# Patient Record
Sex: FEMALE | Race: WHITE | Employment: UNEMPLOYED | URBAN - METROPOLITAN AREA
[De-identification: names, ages, dates, MRNs, and addresses within clinical notes are randomized per-mention and may not be internally consistent; named-entity substitution may affect disease eponyms.]

---

## 2019-07-16 ENCOUNTER — OFFICE VISIT (OUTPATIENT)
Dept: ORTHOPEDIC SURGERY | Age: 84
End: 2019-07-16
Payer: MEDICARE

## 2019-07-16 VITALS
HEART RATE: 74 BPM | BODY MASS INDEX: 29.81 KG/M2 | DIASTOLIC BLOOD PRESSURE: 65 MMHG | TEMPERATURE: 97.7 F | SYSTOLIC BLOOD PRESSURE: 138 MMHG | WEIGHT: 162 LBS | HEIGHT: 62 IN

## 2019-07-16 DIAGNOSIS — M25.561 RIGHT KNEE PAIN, UNSPECIFIED CHRONICITY: ICD-10-CM

## 2019-07-16 DIAGNOSIS — M17.0 PRIMARY OSTEOARTHRITIS OF BOTH KNEES: Primary | ICD-10-CM

## 2019-07-16 PROCEDURE — G8419 CALC BMI OUT NRM PARAM NOF/U: HCPCS | Performed by: ORTHOPAEDIC SURGERY

## 2019-07-16 PROCEDURE — 1036F TOBACCO NON-USER: CPT | Performed by: ORTHOPAEDIC SURGERY

## 2019-07-16 PROCEDURE — 1090F PRES/ABSN URINE INCON ASSESS: CPT | Performed by: ORTHOPAEDIC SURGERY

## 2019-07-16 PROCEDURE — 99203 OFFICE O/P NEW LOW 30 MIN: CPT | Performed by: ORTHOPAEDIC SURGERY

## 2019-07-16 PROCEDURE — 4040F PNEUMOC VAC/ADMIN/RCVD: CPT | Performed by: ORTHOPAEDIC SURGERY

## 2019-07-16 PROCEDURE — 1123F ACP DISCUSS/DSCN MKR DOCD: CPT | Performed by: ORTHOPAEDIC SURGERY

## 2019-07-16 PROCEDURE — G8427 DOCREV CUR MEDS BY ELIG CLIN: HCPCS | Performed by: ORTHOPAEDIC SURGERY

## 2019-07-16 RX ORDER — TRIAMTERENE AND HYDROCHLOROTHIAZIDE 75; 50 MG/1; MG/1
0.5 TABLET ORAL DAILY
Refills: 3 | COMMUNITY
Start: 2019-04-22

## 2019-07-16 SDOH — HEALTH STABILITY: MENTAL HEALTH: HOW OFTEN DO YOU HAVE A DRINK CONTAINING ALCOHOL?: NEVER

## 2019-08-27 ENCOUNTER — OFFICE VISIT (OUTPATIENT)
Dept: ORTHOPEDIC SURGERY | Age: 84
End: 2019-08-27
Payer: MEDICARE

## 2019-08-27 VITALS
HEIGHT: 62 IN | SYSTOLIC BLOOD PRESSURE: 140 MMHG | TEMPERATURE: 97.3 F | HEART RATE: 75 BPM | BODY MASS INDEX: 29.81 KG/M2 | WEIGHT: 162 LBS | DIASTOLIC BLOOD PRESSURE: 73 MMHG

## 2019-08-27 DIAGNOSIS — M17.0 PRIMARY OSTEOARTHRITIS OF BOTH KNEES: Primary | ICD-10-CM

## 2019-08-27 PROCEDURE — G8419 CALC BMI OUT NRM PARAM NOF/U: HCPCS | Performed by: ORTHOPAEDIC SURGERY

## 2019-08-27 PROCEDURE — 1123F ACP DISCUSS/DSCN MKR DOCD: CPT | Performed by: ORTHOPAEDIC SURGERY

## 2019-08-27 PROCEDURE — 4040F PNEUMOC VAC/ADMIN/RCVD: CPT | Performed by: ORTHOPAEDIC SURGERY

## 2019-08-27 PROCEDURE — G8427 DOCREV CUR MEDS BY ELIG CLIN: HCPCS | Performed by: ORTHOPAEDIC SURGERY

## 2019-08-27 PROCEDURE — 1036F TOBACCO NON-USER: CPT | Performed by: ORTHOPAEDIC SURGERY

## 2019-08-27 PROCEDURE — 99213 OFFICE O/P EST LOW 20 MIN: CPT | Performed by: ORTHOPAEDIC SURGERY

## 2019-08-27 PROCEDURE — 1090F PRES/ABSN URINE INCON ASSESS: CPT | Performed by: ORTHOPAEDIC SURGERY

## 2019-08-27 NOTE — PROGRESS NOTES
answered follow-up as needed. Return if symptoms worsen or fail to improve. Current Outpatient Medications   Medication Sig Dispense Refill    triamterene-hydrochlorothiazide (MAXZIDE) 75-50 MG per tablet Take 0.5 tablets by mouth daily   3     No current facility-administered medications for this visit. There is no problem list on file for this patient. Past Medical History:   Diagnosis Date    Hypertension        Past Surgical History:   Procedure Laterality Date    CATARACT REMOVAL WITH IMPLANT Bilateral        No Known Allergies    Social History     Socioeconomic History    Marital status:      Spouse name: None    Number of children: None    Years of education: None    Highest education level: None   Occupational History    None   Social Needs    Financial resource strain: None    Food insecurity:     Worry: None     Inability: None    Transportation needs:     Medical: None     Non-medical: None   Tobacco Use    Smoking status: Never Smoker    Smokeless tobacco: Never Used   Substance and Sexual Activity    Alcohol use: Yes     Alcohol/week: 1.0 standard drinks     Types: 1 Glasses of wine per week     Frequency: Never     Comment: Daily    Drug use: Never    Sexual activity: None   Lifestyle    Physical activity:     Days per week: None     Minutes per session: None    Stress: None   Relationships    Social connections:     Talks on phone: None     Gets together: None     Attends Faith service: None     Active member of club or organization: None     Attends meetings of clubs or organizations: None     Relationship status: None    Intimate partner violence:     Fear of current or ex partner: None     Emotionally abused: None     Physically abused: None     Forced sexual activity: None   Other Topics Concern    None   Social History Narrative    None       History reviewed. No pertinent family history.       Review of Systems  As follows except as previously

## 2021-03-09 ENCOUNTER — OFFICE VISIT (OUTPATIENT)
Dept: ORTHOPEDIC SURGERY | Age: 86
End: 2021-03-09
Payer: MEDICARE

## 2021-03-09 VITALS — BODY MASS INDEX: 29.81 KG/M2 | WEIGHT: 162 LBS | HEIGHT: 62 IN

## 2021-03-09 DIAGNOSIS — M75.41 IMPINGEMENT SYNDROME OF SHOULDER REGION, RIGHT: Primary | ICD-10-CM

## 2021-03-09 DIAGNOSIS — M79.601 PAIN OF RIGHT UPPER EXTREMITY: ICD-10-CM

## 2021-03-09 PROCEDURE — G8417 CALC BMI ABV UP PARAM F/U: HCPCS | Performed by: ORTHOPAEDIC SURGERY

## 2021-03-09 PROCEDURE — G8484 FLU IMMUNIZE NO ADMIN: HCPCS | Performed by: ORTHOPAEDIC SURGERY

## 2021-03-09 PROCEDURE — G8427 DOCREV CUR MEDS BY ELIG CLIN: HCPCS | Performed by: ORTHOPAEDIC SURGERY

## 2021-03-09 PROCEDURE — 4040F PNEUMOC VAC/ADMIN/RCVD: CPT | Performed by: ORTHOPAEDIC SURGERY

## 2021-03-09 PROCEDURE — 1036F TOBACCO NON-USER: CPT | Performed by: ORTHOPAEDIC SURGERY

## 2021-03-09 PROCEDURE — 1123F ACP DISCUSS/DSCN MKR DOCD: CPT | Performed by: ORTHOPAEDIC SURGERY

## 2021-03-09 PROCEDURE — 99213 OFFICE O/P EST LOW 20 MIN: CPT | Performed by: ORTHOPAEDIC SURGERY

## 2021-03-09 PROCEDURE — 1090F PRES/ABSN URINE INCON ASSESS: CPT | Performed by: ORTHOPAEDIC SURGERY

## 2021-03-09 NOTE — PROGRESS NOTES
Chief Complaint:   Chief Complaint   Patient presents with    Arm Pain     Right upper arm pain since January. No X-rays. States 1st COVID injection caused pain to be worse. Pain when she lifts arm or pushes off when getting out of chair. Dawit Barr presents with an approximate 2-month history of diffuse subdeltoid pain at and below the right shoulder, no history of trauma, onset may have been after doing some overuse lifting and carrying objects, pain is persistent not interfering with sleep however, limiting her daily activities involving reaching forward or overhead. Denies numbness tingling in the hand. Denies previous problems with the shoulder no other joint complaints. Allergies; medications; past medical, surgical, family, and social history; and problem list have been reviewed today and updated as indicated in this encounter seen below. Exam: Healthy-appearing elderly female, left upper extremity normal, right shoulder shows no effusion or erythema, subacromial tenderness noted, passive range of motion normal but with grossly positive impingement signs with pain in forward or in abduction, negative drop test but weak with resisted abduction and flexion, no crepitus with rotation. No objective motor or sensory deficit in the hand. Radiographs: Deferred today benign clinical exam.    Mary Ogden was seen today for arm pain. Diagnoses and all orders for this visit:    Impingement syndrome of shoulder region, right  -     Ambulatory referral to Physical Therapy    Pain of right upper extremity       Diagnosis and treatment alternatives were reviewed with the patient, I suspect she likely has degenerative cuff pathology with significant impingement symptoms. She does not wish to pursue injections at this time, instead she was referred for physical therapy, advised to take over-the-counter's and low doses as needed if they are helpful.   If symptoms are persistent she will return in 1 month repeat exam to include x-rays and possible injection right shoulder. Return in about 1 month (around 4/9/2021). Current Outpatient Medications   Medication Sig Dispense Refill    triamterene-hydrochlorothiazide (MAXZIDE) 75-50 MG per tablet Take 0.5 tablets by mouth daily   3     No current facility-administered medications for this visit. There is no problem list on file for this patient. Past Medical History:   Diagnosis Date    Hypertension        Past Surgical History:   Procedure Laterality Date    BLEPHAROPLASTY Bilateral 2011    CATARACT REMOVAL WITH IMPLANT Bilateral        No Known Allergies    Social History     Socioeconomic History    Marital status:      Spouse name: None    Number of children: None    Years of education: None    Highest education level: None   Occupational History    None   Social Needs    Financial resource strain: None    Food insecurity     Worry: None     Inability: None    Transportation needs     Medical: None     Non-medical: None   Tobacco Use    Smoking status: Never Smoker    Smokeless tobacco: Never Used   Substance and Sexual Activity    Alcohol use: Yes     Alcohol/week: 1.0 standard drinks     Types: 1 Glasses of wine per week     Frequency: Never     Comment: Daily    Drug use: Never    Sexual activity: None   Lifestyle    Physical activity     Days per week: None     Minutes per session: None    Stress: None   Relationships    Social connections     Talks on phone: None     Gets together: None     Attends Anabaptist service: None     Active member of club or organization: None     Attends meetings of clubs or organizations: None     Relationship status: None    Intimate partner violence     Fear of current or ex partner: None     Emotionally abused: None     Physically abused: None     Forced sexual activity: None   Other Topics Concern    None   Social History Narrative    None       History reviewed.  No pertinent family history. Review of Systems  As follows except as previously noted in HPI:  Constitutional: Negative for chills, diaphoresis, fatigue, fever and unexpected weight change. Respiratory: Negative for cough, shortness of breath and wheezing. Cardiovascular: Negative for chest pain and palpitations. Neurological: Negative for dizziness, syncope, cephalgia. GI / : negative  Musculoskeletal: see HPI       Objective:   Physical Exam   Constitutional: Oriented to person, place, and time. and appears well-developed and well-nourished. :   Head: Normocephalic and atraumatic. Eyes: EOM are normal.   Neck: Neck supple. Cardiovascular: Normal rate and regular rhythm. Pulmonary/Chest: Effort normal. No stridor. No respiratory distress, no wheezes. Abdominal:  No abnormal distension. Neurological: Alert and oriented to person, place, and time. Skin: Skin is warm and dry. Psychiatric: Normal mood and affect.  Behavior is normal. Thought content normal.    3/9/2021  3:07 PM

## 2021-03-11 ENCOUNTER — EVALUATION (OUTPATIENT)
Dept: PHYSICAL THERAPY | Age: 86
End: 2021-03-11
Payer: MEDICARE

## 2021-03-11 ENCOUNTER — TELEPHONE (OUTPATIENT)
Dept: ORTHOPEDIC SURGERY | Age: 86
End: 2021-03-11

## 2021-03-11 DIAGNOSIS — M75.41 IMPINGEMENT SYNDROME OF RIGHT SHOULDER: ICD-10-CM

## 2021-03-11 DIAGNOSIS — M75.81 TENDINITIS OF RIGHT ROTATOR CUFF: Primary | ICD-10-CM

## 2021-03-11 PROCEDURE — 97110 THERAPEUTIC EXERCISES: CPT | Performed by: PHYSICAL THERAPIST

## 2021-03-11 PROCEDURE — 97140 MANUAL THERAPY 1/> REGIONS: CPT | Performed by: PHYSICAL THERAPIST

## 2021-03-11 PROCEDURE — 97161 PT EVAL LOW COMPLEX 20 MIN: CPT | Performed by: PHYSICAL THERAPIST

## 2021-03-11 PROCEDURE — 97112 NEUROMUSCULAR REEDUCATION: CPT | Performed by: PHYSICAL THERAPIST

## 2021-03-11 NOTE — TELEPHONE ENCOUNTER
3/11/2021 11:27 am Patient phoned office / message left on voice mail: Saw doctor on Tuesday for a bad arm, ever since Tuesday I cannot lift my arm. I have pain in my elbow to my shoulder. I do not know if it was because they were working on it. I cannot even lift a cup of coffee. After review of office notes in epic from 3/09/2021, it was noted that PT was ordered. Patient has an appointment to start PT on March 23 rd. Checked w/ the  in PT who reports: Have a cancellation for this afternoon at 2:15 pm.     3/11/2021 11:56 am Follow up phone call / spoke w/ and informed patient: Appointment available in PT this afternoon. Patient reports: Daughter is due home at 12:30 pm, will see if she can drive patient to PT appointment. Patient does not want to drive d/t arm hurts too bad.  Informed patient Dr. Gustavo Kumar is currently out of the office until next Tuesday / Will send message to the doctor

## 2021-03-11 NOTE — PROGRESS NOTES
[address] [facility] [facility] [address] [facility]   Phone: [phone]   Fax: [phone]           Date:  3/11/2021   Patient: Shara Fernandez  : 1932  MRN: [id number]  Referring Provider: Yulisa Fritz MD  [address] [place] [address]     Medical Diagnosis: Right shoulder pain. Right rotator cuff tendonitis. Right sh impingement         SUBJECTIVE:     Onset date:     Onset[de-identified] Sudden onset    Mechanism of Injury / History: The pt reports that she began experiencing sudden onset of pain & weakness at right sh with movement during the mth of  without any known mechanism of injury. Patient is right handed. Previous PT: none    Medical Management for Current Problem: none    Chief complaint: pain, decreased motion, inability / limited ability to use arm, limited ability to complete ADLs and IADLs, limited ability to lift/carry/handle material and limited ability to complete home/outdoor chores/tasks    Behavior: condition is getting worse    Pain: right sh pain  Current: 5/10     Best: 3/10     Worst:9/10    Aggravated by: reaching overhead, reaching out, reaching behind back, lifting/carrying/material handling    Relieved by: rest    Symptom Type/Quality: sharp, shooting, piercing  Location[de-identified] gross & diffuse right sh        Imaging results: No results found. Past Medical History:  Past Medical History:   Diagnosis Date    Hypertension      Past Surgical History:   Procedure Laterality Date    BLEPHAROPLASTY Bilateral     CATARACT REMOVAL WITH IMPLANT Bilateral        Medications:   Current Outpatient Medications   Medication Sig Dispense Refill    triamterene-hydrochlorothiazide (MAXZIDE) 75-50 MG per tablet Take 0.5 tablets by mouth daily   3     No current facility-administered medications for this visit. Occupation: retired.      Exercise regimen: none    Hobbies: gambling     Patient Goals: pain relief, return to prior activity, full use of arm, return to hobbies patient. If you have questions or comments, please contact me at numbers listed above. Electronically signed by: Obi Castellanos, PT PT , DPT [id number]    Medicare Patients Only     Please sign Physician's Certification and return to: [address]  [facility]  Dept: [phone]  Dept Fax: [phone] Certification / Comments     Frequency/Duration 2 days per week for 6 weeks. Certification period from 3/11/2021  to 4/30/2021. I have reviewed the Plan of Care established for skilled therapy services and certify that the services are required and that they will be provided while the patient is under my care.     Physician's Comments/Revisions:               Physician's Printed Name:                                           [de-identified] Signature:                                                               Date:

## 2021-03-11 NOTE — PROGRESS NOTES
[address] [facility]   Phone: [phone]   Fax: [phone]      Physical Therapy Daily Treatment Note    Date: 3/11/2021  Patient Name: Melodie Champion  : 1932   MRN: [id number]  DOInjury:   DOSx: NA   Referring Provider: Werner Maldonado MD  [address] [place] [address]     Medical Diagnosis: right sh pain. Right rotator cuff tendonitis. Right shoulder impingement        Outcome Measure: QuickDASH 32.5% Disability    S: The pt reports worsened right sh pain & inability to move it since physician's appointment on 3/8/2021  O: Pt given written HEP. Please refer to eval 3/11/2021  Time 2199-8124     Visit  Repeat outcome measure at mid point and end. Pain 8/10     Strength 2-/5     Palpation Supraspinatus & teres minor     ROM abd 30 flex 0     Modalities            Manual      [place]  AP, distraction,ER,IR,quadrant flex/abd/ER Right sh MT   Reza AP with arom abd Right sh MT   Stretch      Table slides flex/abd/sweeps 3x 10  TE   Wall Flexion towel 1x 5  TE   Wall ER stretch      Towel IR stretch      IR reaching behind back      Exercise      Shrugs AROM      Pendulum Ex      UBE      Pulleys - flex      Pulleys-IR      Supine wand chest press      Supine wand flex/abd/chest press/ER 3x 10 Right sh TE   Supine wand ER/IR      Supine flexion      S-lying ABD      S-lying ER      Standing wand flex      Standing flexion      Standing ABD      Kinesio Taping Mechanical correction Right sh NR   ROWS: H Functional activities  To aid in ROM and strength needed for reaching , lifting ,pushing and pulling at home/work    ROWS: M  \"    ROWS: L  \"    ER  \"    IR  \"                A:  Tolerated well. Above added to written HEP.   P: Continue with rehab plan  Elsa Gallo, PT DPT, PT [id number]    Treatment Charges: Mins Units   Initial Evaluation 20 1   Re-Evaluation     Ther Exercise         TE 15 1   Manual Therapy     MT 15 1   Ther Activities        TA     Gait Training          GT     Neuro Re-education NR 15 1   Modalities     Non-Billable Service Time     Other     Total Time/Units 65 4

## 2021-03-12 NOTE — TELEPHONE ENCOUNTER
3/12/2021 11:05 am Follow up phone call / spoke w/ patient who reports: Worked w/ therapy yesterday. Therapy helped a lot! Arm is feeling better. Patient informed of [facility]'s response: OK to take Tylenol 1000 mg three times a day not to exceed 3000 mg / 24 hrs. Instructed patient to write instructions down.  Patient complied and read instructions back per request.

## 2021-03-24 ENCOUNTER — EVALUATION (OUTPATIENT)
Dept: PHYSICAL THERAPY | Age: 86
End: 2021-03-24

## 2021-03-24 ENCOUNTER — TREATMENT (OUTPATIENT)
Dept: PHYSICAL THERAPY | Age: 86
End: 2021-03-24
Payer: MEDICARE

## 2021-03-24 DIAGNOSIS — M75.41 IMPINGEMENT SYNDROME OF RIGHT SHOULDER: ICD-10-CM

## 2021-03-24 DIAGNOSIS — M75.81 TENDINITIS OF RIGHT ROTATOR CUFF: Primary | ICD-10-CM

## 2021-03-24 PROCEDURE — 97110 THERAPEUTIC EXERCISES: CPT

## 2021-03-24 NOTE — PROGRESS NOTES
BEN , [facility], PTA [id number]    Treatment Charges: Mins Units   Initial Evaluation     Re-Evaluation     Ther Exercise         TE 45 3   Manual Therapy     MT     Ther Activities        TA     Gait Training          GT     Neuro Re-education NR     Modalities     Non-Billable Service Time     Other     Total Time/Units 45 3

## 2021-03-30 ENCOUNTER — TREATMENT (OUTPATIENT)
Dept: PHYSICAL THERAPY | Age: 86
End: 2021-03-30
Payer: MEDICARE

## 2021-03-30 DIAGNOSIS — M75.81 TENDINITIS OF RIGHT ROTATOR CUFF: Primary | ICD-10-CM

## 2021-03-30 PROCEDURE — 97110 THERAPEUTIC EXERCISES: CPT

## 2021-03-30 NOTE — PROGRESS NOTES
[address] [facility]   Phone: [phone]   Fax: [phone]      Physical Therapy Daily Treatment Note    Date: 3/30/2021  Patient Name: Marilin Thacker  : 1932   MRN: [id number]  DOInjury:   DOSx: NA   Referring Provider: Leoncio Lemus MD  [address] [place] [address]     Medical Diagnosis: right sh pain. Right rotator cuff tendonitis. Right shoulder impingement        Outcome Measure: QuickDASH 32.5% Disability    S:  Patient reports no pain at rest in R shoulder prior to session this afternoon. Continues to report increased pain when lifting coffee cup. O:   Time 8920-684     Visit 3/12 Repeat outcome measure at mid point and end. Pain See above     Strength 2-/5     Palpation Supraspinatus & teres minor     ROM abd 30 flex 0     Modalities            Manual      [place]  Right sh MT   [place] Right sh MT   Stretch      Table slides flex/abd/sweeps  TE   Wall Flexion/scaption towel  TE   Wall ER stretch      Towel IR stretch      IR reaching behind back      Exercise      Shrugs AROM      Pendulum Ex crosses/circles    Scap retractions   TE   UBE      Pulleys - flex, scap 4 min  TE   Pulleys-IR      Supine wand chest press      Supine wand flex/abd/chest press/ER 2 x 10 ea  TE   Supine wand ER/IR 2 x 10     Supine flexion      S-lying ABD      S-lying ER X 10 AAROM    Standing wand flex      Standing flexion      Standing ABD      Kinesio Taping  Right sh NR   ROWS: H Functional activities  To aid in ROM and strength needed for reaching , lifting ,pushing and pulling at home/work    ROWS: M 2 x 10 OTB    ROWS: L  \"    ER - OTB    IR 2 x 10 OTB                A:  Tolerated well. Anterior capsule stretch added to HEP. Attempted ER iso, however pt unable to follow instruction to perform correctly, therefore discontinued.      P: Continue with rehab plan    Javon Rene, RONDA [id number]    Treatment Charges: Mins Units   Initial Evaluation Re-Evaluation     Ther Exercise         TE 40 3   Manual Therapy     MT     Ther Activities        TA     Gait Training          GT     Neuro Re-education NR     Modalities     Non-Billable Service Time     Other     Total Time/Units 40 3

## 2021-04-01 ENCOUNTER — TREATMENT (OUTPATIENT)
Dept: PHYSICAL THERAPY | Age: 86
End: 2021-04-01
Payer: MEDICARE

## 2021-04-01 DIAGNOSIS — M75.81 TENDINITIS OF RIGHT ROTATOR CUFF: Primary | ICD-10-CM

## 2021-04-01 PROCEDURE — 97110 THERAPEUTIC EXERCISES: CPT

## 2021-04-01 NOTE — PROGRESS NOTES
[address] [facility]   Phone: [phone]   Fax: [phone]      Physical Therapy Daily Treatment Note    Date: 2021  Patient Name: Siobhan Schmidt  : 1932   MRN: [id number]  DOInjury:   DOSx: NA   Referring Provider: Brandi Alcazar MD  [address] [place] [address]     Medical Diagnosis: right sh pain. Right rotator cuff tendonitis. Right shoulder impingement        Outcome Measure: QuickDASH 32.5% Disability    S:  Patient reports no pain at rest in R shoulder prior to session this afternoon. Continues to report increased pain when eating or bringing coffee to her mouth. O:   Time 3805-2211     Visit  Repeat outcome measure at mid point and end. Pain See above     Strength 2-/5     Palpation Supraspinatus & teres minor     ROM abd 30 flex 0     Modalities            Manual      [place]  Right sh MT   [place] Right sh MT   Stretch      Table slides flex/ 3 x10 ea R UE   TE   Table ER stretch 10 x 10s hold    Wall Flexion/scaption towel  TE   Wall ER stretch      Towel IR stretch      IR reaching behind back      Exercise      Shrugs AROM      Pendulum Ex crosses/circles    Scap retractions   TE   UBE 5 min. Pulleys - flex, scap 4 min  TE   Pulleys-IR      Supine wand chest press      Supine wand flex/abd/chest press/ER, horizontal abd 2 x 10 ea  TE   Supine  ER/IR 2 x 10 AAROM added to HEP    Supine flexion      S-lying ABD      S-lying ER X 10 AAROM    Standing wand flex      Standing flexion      Standing ABD      Kinesio Taping  Right sh NR   ROWS: H Functional activities  To aid in ROM and strength needed for reaching , lifting ,pushing and pulling at home/work    ROWS: M OTB    ROWS: L \"    ER OTB    IR OTB                A:  Tolerated well. ER supine therex added to HEP. No c/o increased pain following treatment.      P: Continue with rehab plan    Georgette Law, PTA [id number]    Treatment Charges: Mins Units   Initial Evaluation Re-Evaluation     Ther Exercise         TE 43 3   Manual Therapy     MT     Ther Activities        TA     Gait Training          GT     Neuro Re-education NR     Modalities     Non-Billable Service Time     Other     Total Time/Units 43 3

## 2021-04-05 ENCOUNTER — TREATMENT (OUTPATIENT)
Dept: PHYSICAL THERAPY | Age: 86
End: 2021-04-05
Payer: MEDICARE

## 2021-04-05 DIAGNOSIS — M75.81 TENDINITIS OF RIGHT ROTATOR CUFF: Primary | ICD-10-CM

## 2021-04-05 DIAGNOSIS — M75.41 IMPINGEMENT SYNDROME OF RIGHT SHOULDER: ICD-10-CM

## 2021-04-05 PROCEDURE — 97110 THERAPEUTIC EXERCISES: CPT

## 2021-04-05 NOTE — PROGRESS NOTES
[address] [facility]   Phone: [phone]   Fax: [phone]      Physical Therapy Daily Treatment Note    Date: 2021  Patient Name: Karena Witt  : 1932   MRN: [id number]  DOInjury:   DOSx: NA   Referring Provider: Queen Dacia MD  [address] [place] [address]     Medical Diagnosis: RIGHT sh pain. Right rotator cuff tendonitis. Right shoulder impingement      Outcome Measure: QuickDASH 32.5% Disability    S:  Patient reports no pain at rest in R shoulder prior to session this morning. She states she continues to have problem with using R arm for such tasks as bringing coffee cup up to drink. O:   Time [id number]     Visit  Repeat outcome measure at mid point and end. Pain 0-5/10      Strength 2-/5     Palpation Supraspinatus & teres minor     ROM abd 30 flex 0     Modalities            Manual      [place]  Right sh MT   [place] Right sh MT   Stretch      Table slides flex/sweeps 3 x10 ea R UE   TE   Table ER stretch 10 x10s hold    Wall Flexion/ 2 x10 BLat pole c towel TE   Wall ER stretch      Towel IR stretch      IR reaching behind back      Exercise      Shrugs AROM      Pendulum Ex crosses/circles    Scap retractions   TE   UBE 5 min     Pulleys - flex, scap 4 min  TE   Pulleys-IR      Supine wand chest press      Supine wand flex/abd/chest press, horizontal abd 2 x10 ea  TE   Supine  ER/IR 2 x10 AAROM added to HEP    Supine flexion      S-lying ABD      S-lying ER 2 x10 AAROM    Standing wand flex      Standing flexion      Standing ABD      Kinesio Taping  Right sh NR   ROWS: H Functional activities  To aid in ROM and strength needed for reaching , lifting ,pushing and pulling at home/work    ROWS: M 3 x 10 OTB    ROWS: L      ER      IR                    A:  Patient performs all exercises well with good effort and pacing. She reports no pain following session.       P: Continue with rehab plan    Pascual Scott, [facility]

## 2021-04-07 ENCOUNTER — TREATMENT (OUTPATIENT)
Dept: PHYSICAL THERAPY | Age: 86
End: 2021-04-07
Payer: MEDICARE

## 2021-04-07 DIAGNOSIS — M75.81 TENDINITIS OF RIGHT ROTATOR CUFF: Primary | ICD-10-CM

## 2021-04-07 DIAGNOSIS — M75.41 IMPINGEMENT SYNDROME OF RIGHT SHOULDER: ICD-10-CM

## 2021-04-07 PROCEDURE — 97110 THERAPEUTIC EXERCISES: CPT | Performed by: PHYSICAL THERAPIST

## 2021-04-07 NOTE — PROGRESS NOTES
[address] [facility]   Phone: [phone]   Fax: [phone]      Physical Therapy Daily Treatment Note    Date: 2021  Patient Name: Saad Tate  : 1932   MRN: [id number]  DOInjury:   DOSx: NA   Referring Provider: Vaughn Smyth MD  [address] [place] [address]     Medical Diagnosis: RIGHT sh pain. Right rotator cuff tendonitis. Right shoulder impingement      Outcome Measure: QuickDASH 32.5% Disability    S:  Patient reports that she continues to have biggest limitation c ER behind head and drinking coffee    O:   Time 930-1015     Visit  Repeat outcome measure at mid point and end. Pain 0-5/10      Strength 2-/5     Palpation Supraspinatus & teres minor     ROM abd 30 flex 0     Modalities            Manual      [place]  AP, distraction,ER,IR,quadrant flex/abd/ERRight sh MT    Right sh MT   Stretch      Table slides flex/sweeps 3 x10 ea R UE   TE   Table ER stretch 10 x10s hold    Wall Flexion/caption towe 2 x10 BLat pole c towel TE   Ball rollout x20 5s holds  TE   [place] ER stretch      Towel IR stretch      IR reaching behind back      Exercise      Shrugs AROM      Pendulum Ex crosses/circles    Scap retractions   TE   UBE 6 min     Pulleys - flex, abd x20 5- 10s holds each TE   Pulleys-IR      Supine wand chest press       TE   AAROM added to HEP    Supine flexion      S-lying ABD      AAROM    Standing wand flex      Standing flexion      Standing ABD      Kinesio Taping  Right sh NR   ROWS: H Functional activities  To aid in ROM and strength needed for reaching , lifting ,pushing and pulling at home/work    OTB    ROWS: L      ER 3x10  TE   IR 3x10  TE                 A:  Patient demonstrated incr in passive ROM following mobilizations but continues to lack strength to complete ER active motion. She tolerated the session well c no pain and moderate fatigue. Will continue 1-2x weekly to further improve functional mobility.   P: Continue with

## 2021-04-14 ENCOUNTER — TREATMENT (OUTPATIENT)
Dept: PHYSICAL THERAPY | Age: 86
End: 2021-04-14
Payer: MEDICARE

## 2021-04-14 DIAGNOSIS — M75.41 IMPINGEMENT SYNDROME OF RIGHT SHOULDER: ICD-10-CM

## 2021-04-14 DIAGNOSIS — M75.81 TENDINITIS OF RIGHT ROTATOR CUFF: Primary | ICD-10-CM

## 2021-04-14 PROCEDURE — 97110 THERAPEUTIC EXERCISES: CPT

## 2021-04-14 NOTE — PROGRESS NOTES
[address] [facility]   Phone: [phone]   Fax: [phone]      Physical Therapy Daily Treatment Note    Date: 2021  Patient Name: Angie Cordova  : 1932   MRN: [id number]  DOInjury:   DOSx: NA   Referring Provider: Des Leigh MD  [address] [place] [address]     Medical Diagnosis: RIGHT sh pain. Right rotator cuff tendonitis. Right shoulder impingement      Outcome Measure: QuickDASH 32.5% Disability    S:   Patient reports no pain in R shoulder at rest prior to session. She reports she feels a \"pulling\" and discomfort with use of R arm as much as 7/10. O:   Time 930- 1012     Visit  Repeat outcome measure at mid point and end. Pain 0-10      Strength 2-/5     Palpation Supraspinatus & teres minor     ROM abd 30 flex 0     Modalities            Manual      [place]  Right sh MT    Right sh MT   Stretch      Table slides flex/sweeps 3 x10 ea R UE   TE   Table ER stretch 10 x10s hold    Wall Flexion/scaption towe 2 x10 BLat pole c towel TE   Ball rollout x20 5s holds  TE   Gonzalez Snelling ER stretch      Towel IR stretch      IR reaching behind back      Exercise      Shrugs AROM      Pendulum Ex crosses/circles    Scap retractions   TE   UBE 6 min     Pulleys - flex, abd x20 5- 10s holds each TE   Pulleys-IR      Supine wand chest press       TE   AAROM added to HEP    Supine flexion      S-lying ABD      AAROM    Standing wand flex      Standing flexion      Standing ABD      Kinesio Taping  Right sh NR   ROWS: H Functional activities  To aid in ROM and strength needed for reaching , lifting ,pushing and pulling at home/work    OTB    ROWS: L      ER 3x10 red tubing TE   IR 3x10 red tubing TE                 A:  Patient tolerates session well. She reports her R shoulder feels good following session.      P:  Continue with rehab plan    Paris Garcia ATC, PTA [id number]    Treatment Charges: Mins Units   Initial Evaluation     Re-Evaluation Ther Exercise         TE 42  3   Manual Therapy     MT     Ther Activities        TA     Gait Training          GT     Neuro Re-education NR     Modalities     Non-Billable Service Time     Other     Total Time/Units 42 3

## 2021-04-16 ENCOUNTER — TREATMENT (OUTPATIENT)
Dept: PHYSICAL THERAPY | Age: 86
End: 2021-04-16
Payer: MEDICARE

## 2021-04-16 DIAGNOSIS — M75.41 IMPINGEMENT SYNDROME OF RIGHT SHOULDER: ICD-10-CM

## 2021-04-16 DIAGNOSIS — M75.81 TENDINITIS OF RIGHT ROTATOR CUFF: Primary | ICD-10-CM

## 2021-04-16 PROCEDURE — 97530 THERAPEUTIC ACTIVITIES: CPT | Performed by: PHYSICAL THERAPIST

## 2021-04-16 PROCEDURE — 97110 THERAPEUTIC EXERCISES: CPT | Performed by: PHYSICAL THERAPIST

## 2021-04-16 PROCEDURE — 97112 NEUROMUSCULAR REEDUCATION: CPT | Performed by: PHYSICAL THERAPIST

## 2021-04-16 NOTE — PROGRESS NOTES
[facility]   Phone: [phone]   Fax: [phone]      Physical Therapy Daily Treatment Note    Date: 2021  Patient Name: Jamie Seals  : 1932   MRN: [id number]  DOInjury:   DOSx: NA   Referring Provider: Telma Moseley MD  [address] [place] [address]     Medical Diagnosis: RIGHT sh pain. Right rotator cuff tendonitis. Right shoulder impingement      Outcome Measure: QuickDASH 32.5% Disability    S:   The pt reports that she is pleased with her progress with PT but continues to c/o occasional difficulty feeding herself & lifting a beverage. O:   Time 9107-6915     Visit  Repeat outcome measure at mid point and end. Pain 0-7/10      Strength 2-/5     Palpation Supraspinatus & teres minor     ROM abd 30 flex 0     Modalities            Manual       Right sh MT   Stretch      Wall Flexion 2 x10 BLat pole c towel TE   Ball rollout x20 5s holds  TE   arom R sh supine flex 20x 1lb DB TA   Standing arom R sh flex 20x 1lb DB TA   Standing R sh abd 20x 1lb DB TA   Exercise      Shrugs AROM      Pendulum Ex crosses/circles    Scap retractions   TE   UBE forward/backward 5min-total     Pulleys - flex, abd, scaption x20 5- 10s holds each TE   Pulleys-IR      Supine wand chest press       TE   AAROM added to HEP    Supine flexion      S-lying ABD      AAROM    Tubing scap rows high, mid, & low 20x  Red NR   Tubing sh ext High & Mid 20x Red NR   Standing ABD      ROWS: H Functional activities  To aid in ROM and strength needed for reaching , lifting ,pushing and pulling at home/work    OTB    ROWS: L                    A:  The pt progressed to perform new dumbbell & resistive tubing exercises with no abnormal diffiuclty. P:  Continue with rehab plan & perform Re-Eval during next Tx session.      Cheyenne Suarez, PT OHPT [id number]    Treatment Charges: Mins Units   Initial Evaluation     Re-Evaluation     Ther Exercise         TE 14 1   Manual Therapy     MT     Ther Activities        TA 11 1   Gait Training          GT     Neuro Re-education NR 15 1   Modalities     Non-Billable Service Time     Other     Total Time/Units 40 3

## 2021-04-20 ENCOUNTER — OFFICE VISIT (OUTPATIENT)
Dept: ORTHOPEDIC SURGERY | Age: 86
End: 2021-04-20
Payer: MEDICARE

## 2021-04-20 ENCOUNTER — TREATMENT (OUTPATIENT)
Dept: PHYSICAL THERAPY | Age: 86
End: 2021-04-20
Payer: MEDICARE

## 2021-04-20 VITALS — HEIGHT: 62 IN | WEIGHT: 162 LBS | BODY MASS INDEX: 29.81 KG/M2

## 2021-04-20 DIAGNOSIS — M75.41 IMPINGEMENT SYNDROME OF RIGHT SHOULDER: ICD-10-CM

## 2021-04-20 DIAGNOSIS — M79.601 PAIN OF RIGHT UPPER EXTREMITY: ICD-10-CM

## 2021-04-20 DIAGNOSIS — M75.81 TENDINITIS OF RIGHT ROTATOR CUFF: Primary | ICD-10-CM

## 2021-04-20 DIAGNOSIS — M75.101 RIGHT ROTATOR CUFF TEAR ARTHROPATHY: Primary | ICD-10-CM

## 2021-04-20 DIAGNOSIS — M12.811 RIGHT ROTATOR CUFF TEAR ARTHROPATHY: Primary | ICD-10-CM

## 2021-04-20 PROCEDURE — 97110 THERAPEUTIC EXERCISES: CPT | Performed by: PHYSICAL THERAPIST

## 2021-04-20 PROCEDURE — G8427 DOCREV CUR MEDS BY ELIG CLIN: HCPCS | Performed by: ORTHOPAEDIC SURGERY

## 2021-04-20 PROCEDURE — 4040F PNEUMOC VAC/ADMIN/RCVD: CPT | Performed by: ORTHOPAEDIC SURGERY

## 2021-04-20 PROCEDURE — 97530 THERAPEUTIC ACTIVITIES: CPT | Performed by: PHYSICAL THERAPIST

## 2021-04-20 PROCEDURE — 1036F TOBACCO NON-USER: CPT | Performed by: ORTHOPAEDIC SURGERY

## 2021-04-20 PROCEDURE — 1123F ACP DISCUSS/DSCN MKR DOCD: CPT | Performed by: ORTHOPAEDIC SURGERY

## 2021-04-20 PROCEDURE — G8417 CALC BMI ABV UP PARAM F/U: HCPCS | Performed by: ORTHOPAEDIC SURGERY

## 2021-04-20 PROCEDURE — 1090F PRES/ABSN URINE INCON ASSESS: CPT | Performed by: ORTHOPAEDIC SURGERY

## 2021-04-20 PROCEDURE — 99213 OFFICE O/P EST LOW 20 MIN: CPT | Performed by: ORTHOPAEDIC SURGERY

## 2021-04-20 NOTE — PROGRESS NOTES
[address] [facility]   Phone: [phone]   Fax: [phone]      Physical Therapy Daily Treatment Note    Date: 2021  Patient Name: Hai Larsen  : 1932   MRN: [id number]  DOInjury:   DOSx: NA   Referring Provider: Silverio Car MD  [address] [place] [address]     Medical Diagnosis: RIGHT sh pain. Right rotator cuff tendonitis. Right shoulder impingement      Outcome Measure: QuickDASH 32.5% Disability    S:   The pt reports that she has improved pain and function, but continues to have some weakness c ER for feeding and drinking coffee. She is returning to orthopedic surgeon today for follow up and determine if additional PT is warranted. O:   Time 930-1010     Visit  Repeat outcome measure at mid point and end. Pain 0-7/10      Strength 2-/5     Palpation Supraspinatus & teres minor     ROM abd 30 flex 0     Modalities            Manual       Right sh MT   Stretch      Wall Flexion 2 x10 BL flex, x10 SA scap 10s holdsat pole c towel TE    TE                     Exercise      Shrugs AROM      Pendulum Ex crosses/circles    Scap retractions   TE   UBE forward/backward 6min-total     each TE   Supine alphabet 3x  No weight TA   Supine wand chest press       TE   AAROM added to HEP    Supine flexion 3x10 No weight TA   Seated flex/scaption 3x10 ea  TA   S-lying ER 3 x10 AROM TA   Red NR   Red NR   Standing ABD      ROWS: H Functional activities  To aid in ROM and strength needed for reaching , lifting ,pushing and pulling at home/work    OTB    ROWS: L                    A:  The pt progressed today c functional stability exercises to improve reach capacity and stimulate RTC. She tolerated the session well c moderate fatigue and no pain. She does continue to lack ER consistent c degenerative cuff tear but has improved globally c ROM and strength. She returns to ortho surgeon today and will continue c PT if deemed necessary.     P:  Continue with rehab plan & perform Re-Eval during next Tx session.      Eitan Ramirez PT DPT [id number]    Treatment Charges: Mins Units   Initial Evaluation     Re-Evaluation     Ther Exercise         TE 14 1   Manual Therapy     MT     Ther Activities        TA 25 2   Gait Training          GT     Neuro Re-education NR     Modalities     Non-Billable Service Time     Other     Total Time/Units 40 3

## 2021-04-20 NOTE — PROGRESS NOTES
Chief Complaint:   Chief Complaint   Patient presents with    Shoulder Pain     FU Right shoulder pain. Has seen improvement of pain with PTx. Has discomfort with hand to mouth movements when eting and drinking. Angie Cordova is here for follow-up of her right shoulder pain, she attended physical therapy and reports dramatic relief of her pain at this point basically pain-free, she continues however to have limitations in functional daily activities especially those involving reaching out forward or lifting carrying. Denies numbness tingling in the hand no other active joint complaints. Allergies; medications; past medical, surgical, family, and social history; and problem list have been reviewed today and updated as indicated in this encounter seen below. Exam: Left shoulder and upper extremity normal, full range of motion no pain. Right shoulder shows limitation in both active and passive motion to approximately 60 degrees of abduction internal rotation of the beltline external rotation 20 degrees. Some mild crepitus is felt today with passive range of motion especially crossarm. Radiographs: AP lateral x-rays of the right shoulder today show profound degenerative disease consistent with cuff tear arthropathy, there is complete loss of glenohumeral and subacromial space with erosions sclerosis and osteophyte formation. Shanthi Shen was seen today for shoulder pain. Diagnoses and all orders for this visit:    Right rotator cuff tear arthropathy    Pain of right upper extremity  -     XR SHOULDER RIGHT (MIN 2 VIEWS);  Future       Diagnosis and treatment alternatives were reviewed with the patient, at this point she is achieved pain relief with physical therapy alone so injections will be deferred, her issues are primarily stiffness and weakness so she will continue with therapeutic exercise on a maintenance basis at home, we did talk about the option of shoulder arthroplasty which may or may not improve her motion and strength so since she is having no pain and at her age as she says she does not wish to consider surgery. She is comfortable with recommendations for home exercise and activity modification she will follow up here as needed. Return if symptoms worsen or fail to improve. Current Outpatient Medications   Medication Sig Dispense Refill    triamterene-hydrochlorothiazide (MAXZIDE) 75-50 MG per tablet Take 0.5 tablets by mouth daily   3     No current facility-administered medications for this visit. There is no problem list on file for this patient. Past Medical History:   Diagnosis Date    Hypertension        Past Surgical History:   Procedure Laterality Date    BLEPHAROPLASTY Bilateral 2011    CATARACT REMOVAL WITH IMPLANT Bilateral        No Known Allergies    Social History     Socioeconomic History    Marital status:      Spouse name: None    Number of children: None    Years of education: None    Highest education level: None   Occupational History    None   Social Needs    Financial resource strain: None    Food insecurity     Worry: None     Inability: None    Transportation needs     Medical: None     Non-medical: None   Tobacco Use    Smoking status: Never Smoker    Smokeless tobacco: Never Used   Substance and Sexual Activity    Alcohol use:  Yes     Alcohol/week: 1.0 standard drinks     Types: 1 Glasses of wine per week     Frequency: Never     Comment: Daily    Drug use: Never    Sexual activity: None   Lifestyle    Physical activity     Days per week: None     Minutes per session: None    Stress: None   Relationships    Social connections     Talks on phone: None     Gets together: None     Attends Denominational service: None     Active member of club or organization: None     Attends meetings of clubs or organizations: None     Relationship status: None    Intimate partner violence     Fear of current or ex partner: None     Emotionally abused: None     Physically abused: None     Forced sexual activity: None   Other Topics Concern    None   Social History Narrative    None       History reviewed. No pertinent family history. Review of Systems  As follows except as previously noted in HPI:  Constitutional: Negative for chills, diaphoresis, fatigue, fever and unexpected weight change. Respiratory: Negative for cough, shortness of breath and wheezing. Cardiovascular: Negative for chest pain and palpitations. Neurological: Negative for dizziness, syncope, cephalgia. GI / : negative  Musculoskeletal: see HPI       Objective:   Physical Exam   Constitutional: Oriented to person, place, and time. and appears well-developed and well-nourished. :   Head: Normocephalic and atraumatic. Eyes: EOM are normal.   Neck: Neck supple. Cardiovascular: Normal rate and regular rhythm. Pulmonary/Chest: Effort normal. No stridor. No respiratory distress, no wheezes. Abdominal:  No abnormal distension. Neurological: Alert and oriented to person, place, and time. Skin: Skin is warm and dry. Psychiatric: Normal mood and affect.  Behavior is normal. Thought content normal.    4/20/2021  4:10 PM

## 2021-05-20 NOTE — PROGRESS NOTES
[address] [facility]   Phone: [phone]   Fax: [phone]    Discharge Summary        Date: 2021  Patient Name: Lella Essex  : 1932              MRN: [id number]  DOInjury:   DOSx: NA   Referring Provider: Carletta Ahumada, MD  [address] [place] [address]                                 Medical Diagnosis: RIGHT sh pain. Right rotator cuff tendonitis. Right shoulder impingement    ATTENDANCE:  Patient attended 9 of 9 scheduled treatments from 3/11/2021  to 21. TREATMENTS RECEIVED:  Therapeutic activity, therapeutic exercise, neuromuscular reeducation, HEP, manual    REASON FOR DISCHARGE: Pt was doing better overall and elected to self d/c. She was cleared for I HEP from physician as well. PATIENT EDUCATION/INSTRUCTIONS: continue HEP as instructed    RECOMMENDATIONS: call c questions or if additional services are warranted. Thank you for the opportunity to work with your patient. If you have questions or comments, please contact me at numbers listed above.       Jeffrey Gamble 89 , DPT PT [id number] 2021

## 2024-12-21 ENCOUNTER — HOSPITAL ENCOUNTER (INPATIENT)
Age: 88
LOS: 4 days | Discharge: HOME | DRG: 812 | End: 2024-12-26
Attending: EMERGENCY MEDICINE | Admitting: INTERNAL MEDICINE
Payer: MEDICARE

## 2024-12-21 ENCOUNTER — APPOINTMENT (OUTPATIENT)
Dept: CT IMAGING | Age: 88
DRG: 812 | End: 2024-12-21
Attending: EMERGENCY MEDICINE
Payer: MEDICARE

## 2024-12-21 ENCOUNTER — APPOINTMENT (OUTPATIENT)
Dept: GENERAL RADIOLOGY | Age: 88
DRG: 812 | End: 2024-12-21
Payer: MEDICARE

## 2024-12-21 DIAGNOSIS — E86.0 DEHYDRATION: ICD-10-CM
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
## 2024-12-21 LAB
ALBUMIN SERPL-MCNC: 3.4 G/DL (ref 3.5–5.2)
ALP SERPL-CCNC: 89 U/L (ref 35–104)
ALT SERPL-CCNC: 18 U/L (ref 0–32)
ANION GAP SERPL CALCULATED.3IONS-SCNC: 14 MMOL/L (ref 7–16)
AST SERPL-CCNC: 39 U/L (ref 0–31)
BACTERIA URNS QL MICRO: ABNORMAL
BASOPHILS # BLD: 0 K/UL (ref 0–0.2)
BASOPHILS NFR BLD: 0 % (ref 0–2)
BILIRUB SERPL-MCNC: 0.3 MG/DL (ref 0–1.2)
BILIRUB UR QL STRIP: NEGATIVE
BNP SERPL-MCNC: 4832 PG/ML (ref 0–450)
BUN SERPL-MCNC: 21 MG/DL (ref 6–23)
CALCIUM SERPL-MCNC: 8.7 MG/DL (ref 8.6–10.2)
CASTS #/AREA URNS LPF: ABNORMAL /LPF
CHLORIDE SERPL-SCNC: 101 MMOL/L (ref 98–107)
CHP ED QC CHECK: NORMAL
CK SERPL-CCNC: 865 U/L (ref 20–180)
CLARITY UR: CLEAR
CO2 SERPL-SCNC: 22 MMOL/L (ref 22–29)
COLOR UR: YELLOW
CREAT SERPL-MCNC: 0.9 MG/DL (ref 0.5–1)
EOSINOPHIL # BLD: 0 K/UL (ref 0.05–0.5)
EOSINOPHILS RELATIVE PERCENT: 0 % (ref 0–6)
ERYTHROCYTE [DISTWIDTH] IN BLOOD BY AUTOMATED COUNT: 19.4 % (ref 11.5–15)
GFR, ESTIMATED: 57 ML/MIN/1.73M2
GLUCOSE BLD-MCNC: 119 MG/DL
GLUCOSE BLD-MCNC: 119 MG/DL (ref 74–99)
GLUCOSE SERPL-MCNC: 115 MG/DL (ref 74–99)
GLUCOSE UR STRIP-MCNC: NEGATIVE MG/DL
HCT VFR BLD AUTO: 27.2 % (ref 34–48)
HGB BLD-MCNC: 7.8 G/DL (ref 11.5–15.5)
HGB UR QL STRIP.AUTO: ABNORMAL
INR PPP: 1.1
KETONES UR STRIP-MCNC: 15 MG/DL
LACTATE BLDV-SCNC: 2.1 MMOL/L (ref 0.5–2.2)
LEUKOCYTE ESTERASE UR QL STRIP: NEGATIVE
LYMPHOCYTES NFR BLD: 0.19 K/UL (ref 1.5–4)
LYMPHOCYTES RELATIVE PERCENT: 2 % (ref 20–42)
MCH RBC QN AUTO: 20.1 PG (ref 26–35)
MCHC RBC AUTO-ENTMCNC: 28.7 G/DL (ref 32–34.5)
MCV RBC AUTO: 69.9 FL (ref 80–99.9)
MONOCYTES NFR BLD: 0.28 K/UL (ref 0.1–0.95)
MONOCYTES NFR BLD: 3 % (ref 2–12)
NEUTROPHILS NFR BLD: 96 % (ref 43–80)
NEUTS SEG NFR BLD: 10.23 K/UL (ref 1.8–7.3)
NITRITE UR QL STRIP: NEGATIVE
PARTIAL THROMBOPLASTIN TIME: 30.5 SEC (ref 24.5–35.1)
PH UR STRIP: 5.5 [PH] (ref 5–9)
PLATELET # BLD AUTO: 319 K/UL (ref 130–450)
PMV BLD AUTO: 10.2 FL (ref 7–12)
POTASSIUM SERPL-SCNC: 3.8 MMOL/L (ref 3.5–5)
PROT SERPL-MCNC: 7.2 G/DL (ref 6.4–8.3)
PROT UR STRIP-MCNC: NEGATIVE MG/DL
PROTHROMBIN TIME: 12 SEC (ref 9.3–12.4)
RBC # BLD AUTO: 3.89 M/UL (ref 3.5–5.5)
RBC # BLD: ABNORMAL 10*6/UL
RBC #/AREA URNS HPF: ABNORMAL /HPF
SODIUM SERPL-SCNC: 137 MMOL/L (ref 132–146)
SP GR UR STRIP: 1.02 (ref 1–1.03)
TROPONIN I SERPL HS-MCNC: 95 NG/L (ref 0–9)
TROPONIN I SERPL HS-MCNC: 95 NG/L (ref 0–9)
UROBILINOGEN UR STRIP-ACNC: 0.2 EU/DL (ref 0–1)
WBC #/AREA URNS HPF: ABNORMAL /HPF
WBC OTHER # BLD: 10.7 K/UL (ref 4.5–11.5)

## 2024-12-21 PROCEDURE — 93005 ELECTROCARDIOGRAM TRACING: CPT | Performed by: EMERGENCY MEDICINE

## 2024-12-21 PROCEDURE — 71045 X-RAY EXAM CHEST 1 VIEW: CPT

## 2024-12-21 PROCEDURE — 84484 ASSAY OF TROPONIN QUANT: CPT

## 2024-12-21 PROCEDURE — 70450 CT HEAD/BRAIN W/O DYE: CPT

## 2024-12-21 PROCEDURE — 2580000003 HC RX 258: Performed by: EMERGENCY MEDICINE

## 2024-12-21 PROCEDURE — 87086 URINE CULTURE/COLONY COUNT: CPT

## 2024-12-21 PROCEDURE — 81001 URINALYSIS AUTO W/SCOPE: CPT

## 2024-12-21 PROCEDURE — 85730 THROMBOPLASTIN TIME PARTIAL: CPT

## 2024-12-21 PROCEDURE — 82947 ASSAY GLUCOSE BLOOD QUANT: CPT

## 2024-12-21 PROCEDURE — 85025 COMPLETE CBC W/AUTO DIFF WBC: CPT

## 2024-12-21 PROCEDURE — 83605 ASSAY OF LACTIC ACID: CPT

## 2024-12-21 PROCEDURE — 83880 ASSAY OF NATRIURETIC PEPTIDE: CPT

## 2024-12-21 PROCEDURE — 82550 ASSAY OF CK (CPK): CPT

## 2024-12-21 PROCEDURE — 85610 PROTHROMBIN TIME: CPT

## 2024-12-21 PROCEDURE — 80053 COMPREHEN METABOLIC PANEL: CPT

## 2024-12-21 RX ORDER — SODIUM CHLORIDE 9 MG/ML
INJECTION, SOLUTION INTRAVENOUS CONTINUOUS
Status: DISCONTINUED | OUTPATIENT
Start: 2024-12-21 | End: 2024-12-24

## 2024-12-21 RX ADMIN — SODIUM CHLORIDE: 9 INJECTION, SOLUTION INTRAVENOUS at 19:50

## 2024-12-21 ASSESSMENT — PAIN - FUNCTIONAL ASSESSMENT
PAIN_FUNCTIONAL_ASSESSMENT: 0-10
PAIN_FUNCTIONAL_ASSESSMENT: NONE - DENIES PAIN

## 2024-12-21 ASSESSMENT — PAIN SCALES - GENERAL: PAINLEVEL_OUTOF10: 0

## 2024-12-22 ENCOUNTER — APPOINTMENT (OUTPATIENT)
Dept: GENERAL RADIOLOGY | Age: 88
DRG: 812 | End: 2024-12-22
Payer: MEDICARE
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
## 2024-12-22 LAB
ANION GAP SERPL CALCULATED.3IONS-SCNC: 13 MMOL/L (ref 7–16)
BUN SERPL-MCNC: 20 MG/DL (ref 6–23)
CALCIUM SERPL-MCNC: 8.2 MG/DL (ref 8.6–10.2)
CHLORIDE SERPL-SCNC: 103 MMOL/L (ref 98–107)
CK SERPL-CCNC: 1095 U/L (ref 20–180)
CO2 SERPL-SCNC: 22 MMOL/L (ref 22–29)
CREAT SERPL-MCNC: 0.9 MG/DL (ref 0.5–1)
DATE, STOOL #1: NORMAL
ERYTHROCYTE [DISTWIDTH] IN BLOOD BY AUTOMATED COUNT: 19.6 % (ref 11.5–15)
FERRITIN SERPL-MCNC: 22 NG/ML
FOLATE SERPL-MCNC: >20 NG/ML (ref 4.8–24.2)
GFR, ESTIMATED: 61 ML/MIN/1.73M2
GLUCOSE SERPL-MCNC: 121 MG/DL (ref 74–99)
HCT VFR BLD AUTO: 25.5 % (ref 34–48)
HEMOCCULT SP1 STL QL: NEGATIVE
HGB BLD-MCNC: 7.2 G/DL (ref 11.5–15.5)
IRON SATN MFR SERPL: 5 % (ref 15–50)
IRON SERPL-MCNC: 16 UG/DL (ref 37–145)
MCH RBC QN AUTO: 20.1 PG (ref 26–35)
MCHC RBC AUTO-ENTMCNC: 28.2 G/DL (ref 32–34.5)
MCV RBC AUTO: 71 FL (ref 80–99.9)
MICROORGANISM SPEC CULT: NO GROWTH
PLATELET # BLD AUTO: 313 K/UL (ref 130–450)
PMV BLD AUTO: 10.7 FL (ref 7–12)
POTASSIUM SERPL-SCNC: 2.9 MMOL/L (ref 3.5–5)
RBC # BLD AUTO: 3.59 M/UL (ref 3.5–5.5)
SERVICE CMNT-IMP: NORMAL
SODIUM SERPL-SCNC: 138 MMOL/L (ref 132–146)
SPECIMEN DESCRIPTION: NORMAL
TIBC SERPL-MCNC: 328 UG/DL (ref 250–450)
URATE SERPL-MCNC: 6.2 MG/DL (ref 2.4–5.7)
VIT B12 SERPL-MCNC: 1786 PG/ML (ref 211–946)
WBC OTHER # BLD: 7.7 K/UL (ref 4.5–11.5)

## 2024-12-22 PROCEDURE — 82550 ASSAY OF CK (CPK): CPT

## 2024-12-22 PROCEDURE — 85027 COMPLETE CBC AUTOMATED: CPT

## 2024-12-22 PROCEDURE — 80048 BASIC METABOLIC PNL TOTAL CA: CPT

## 2024-12-22 PROCEDURE — 73610 X-RAY EXAM OF ANKLE: CPT

## 2024-12-22 PROCEDURE — 6360000002 HC RX W HCPCS: Performed by: INTERNAL MEDICINE

## 2024-12-22 PROCEDURE — 83550 IRON BINDING TEST: CPT

## 2024-12-22 PROCEDURE — 36415 COLL VENOUS BLD VENIPUNCTURE: CPT

## 2024-12-22 PROCEDURE — 97165 OT EVAL LOW COMPLEX 30 MIN: CPT

## 2024-12-22 PROCEDURE — 99285 EMERGENCY DEPT VISIT HI MDM: CPT

## 2024-12-22 PROCEDURE — 97161 PT EVAL LOW COMPLEX 20 MIN: CPT

## 2024-12-22 PROCEDURE — 96360 HYDRATION IV INFUSION INIT: CPT

## 2024-12-22 PROCEDURE — 2500000003 HC RX 250 WO HCPCS: Performed by: INTERNAL MEDICINE

## 2024-12-22 PROCEDURE — 82270 OCCULT BLOOD FECES: CPT

## 2024-12-22 PROCEDURE — 82746 ASSAY OF FOLIC ACID SERUM: CPT

## 2024-12-22 PROCEDURE — 82607 VITAMIN B-12: CPT

## 2024-12-22 PROCEDURE — 96361 HYDRATE IV INFUSION ADD-ON: CPT

## 2024-12-22 PROCEDURE — 84550 ASSAY OF BLOOD/URIC ACID: CPT

## 2024-12-22 PROCEDURE — 6370000000 HC RX 637 (ALT 250 FOR IP): Performed by: INTERNAL MEDICINE

## 2024-12-22 PROCEDURE — 83540 ASSAY OF IRON: CPT

## 2024-12-22 PROCEDURE — 2140000000 HC CCU INTERMEDIATE R&B

## 2024-12-22 PROCEDURE — 2580000003 HC RX 258: Performed by: INTERNAL MEDICINE

## 2024-12-22 PROCEDURE — 82728 ASSAY OF FERRITIN: CPT

## 2024-12-22 RX ORDER — ONDANSETRON 2 MG/ML
4 INJECTION INTRAMUSCULAR; INTRAVENOUS EVERY 6 HOURS PRN
Status: DISCONTINUED | OUTPATIENT
Start: 2024-12-22 | End: 2024-12-26 | Stop reason: HOSPADM

## 2024-12-22 RX ORDER — MAGNESIUM SULFATE IN WATER 40 MG/ML
2000 INJECTION, SOLUTION INTRAVENOUS PRN
Status: DISCONTINUED | OUTPATIENT
Start: 2024-12-22 | End: 2024-12-26 | Stop reason: HOSPADM

## 2024-12-22 RX ORDER — SODIUM CHLORIDE 0.9 % (FLUSH) 0.9 %
5-40 SYRINGE (ML) INJECTION PRN
Status: DISCONTINUED | OUTPATIENT
Start: 2024-12-22 | End: 2024-12-26 | Stop reason: HOSPADM

## 2024-12-22 RX ORDER — ACETAMINOPHEN 325 MG/1
650 TABLET ORAL EVERY 6 HOURS PRN
Status: DISCONTINUED | OUTPATIENT
Start: 2024-12-22 | End: 2024-12-26 | Stop reason: HOSPADM

## 2024-12-22 RX ORDER — ENOXAPARIN SODIUM 100 MG/ML
40 INJECTION SUBCUTANEOUS DAILY
Status: DISCONTINUED | OUTPATIENT
Start: 2024-12-22 | End: 2024-12-26 | Stop reason: HOSPADM

## 2024-12-22 RX ORDER — SODIUM CHLORIDE 9 MG/ML
INJECTION, SOLUTION INTRAVENOUS PRN
Status: DISCONTINUED | OUTPATIENT
Start: 2024-12-22 | End: 2024-12-26 | Stop reason: HOSPADM

## 2024-12-22 RX ORDER — POLYETHYLENE GLYCOL 3350 17 G/17G
17 POWDER, FOR SOLUTION ORAL DAILY PRN
Status: DISCONTINUED | OUTPATIENT
Start: 2024-12-22 | End: 2024-12-26 | Stop reason: HOSPADM

## 2024-12-22 RX ORDER — POTASSIUM CHLORIDE 1500 MG/1
40 TABLET, EXTENDED RELEASE ORAL PRN
Status: DISCONTINUED | OUTPATIENT
Start: 2024-12-22 | End: 2024-12-26 | Stop reason: HOSPADM

## 2024-12-22 RX ORDER — SODIUM CHLORIDE 0.9 % (FLUSH) 0.9 %
5-40 SYRINGE (ML) INJECTION EVERY 12 HOURS SCHEDULED
Status: DISCONTINUED | OUTPATIENT
Start: 2024-12-22 | End: 2024-12-26 | Stop reason: HOSPADM

## 2024-12-22 RX ORDER — ACETAMINOPHEN 650 MG/1
650 SUPPOSITORY RECTAL EVERY 6 HOURS PRN
Status: DISCONTINUED | OUTPATIENT
Start: 2024-12-22 | End: 2024-12-26 | Stop reason: HOSPADM

## 2024-12-22 RX ORDER — POTASSIUM CHLORIDE 7.45 MG/ML
10 INJECTION INTRAVENOUS PRN
Status: DISCONTINUED | OUTPATIENT
Start: 2024-12-22 | End: 2024-12-26 | Stop reason: HOSPADM

## 2024-12-22 RX ORDER — ONDANSETRON 4 MG/1
4 TABLET, ORALLY DISINTEGRATING ORAL EVERY 8 HOURS PRN
Status: DISCONTINUED | OUTPATIENT
Start: 2024-12-22 | End: 2024-12-26 | Stop reason: HOSPADM

## 2024-12-22 RX ADMIN — POTASSIUM CHLORIDE 10 MEQ: 7.46 INJECTION, SOLUTION INTRAVENOUS at 17:57

## 2024-12-22 RX ADMIN — POTASSIUM CHLORIDE 10 MEQ: 7.46 INJECTION, SOLUTION INTRAVENOUS at 14:52

## 2024-12-22 RX ADMIN — ENOXAPARIN SODIUM 40 MG: 100 INJECTION SUBCUTANEOUS at 09:20

## 2024-12-22 RX ADMIN — ACETAMINOPHEN 650 MG: 325 TABLET ORAL at 15:19

## 2024-12-22 RX ADMIN — SODIUM CHLORIDE: 9 INJECTION, SOLUTION INTRAVENOUS at 23:15

## 2024-12-22 RX ADMIN — POTASSIUM CHLORIDE 10 MEQ: 7.46 INJECTION, SOLUTION INTRAVENOUS at 19:04

## 2024-12-22 RX ADMIN — POTASSIUM CHLORIDE 10 MEQ: 7.46 INJECTION, SOLUTION INTRAVENOUS at 16:53

## 2024-12-22 RX ADMIN — SODIUM CHLORIDE, PRESERVATIVE FREE 10 ML: 5 INJECTION INTRAVENOUS at 09:20

## 2024-12-22 RX ADMIN — POTASSIUM CHLORIDE 10 MEQ: 7.46 INJECTION, SOLUTION INTRAVENOUS at 23:16

## 2024-12-22 RX ADMIN — POTASSIUM CHLORIDE 10 MEQ: 7.46 INJECTION, SOLUTION INTRAVENOUS at 15:50

## 2024-12-22 ASSESSMENT — PAIN DESCRIPTION - DESCRIPTORS: DESCRIPTORS: ACHING

## 2024-12-22 ASSESSMENT — PAIN DESCRIPTION - LOCATION: LOCATION: ARM

## 2024-12-22 ASSESSMENT — PAIN SCALES - GENERAL
PAINLEVEL_OUTOF10: 3
PAINLEVEL_OUTOF10: 6

## 2024-12-22 ASSESSMENT — PAIN DESCRIPTION - ORIENTATION: ORIENTATION: RIGHT;UPPER

## 2024-12-22 ASSESSMENT — PAIN - FUNCTIONAL ASSESSMENT: PAIN_FUNCTIONAL_ASSESSMENT: ACTIVITIES ARE NOT PREVENTED

## 2024-12-22 NOTE — CONSULTS
General Surgery   Consult Note      Patient's Name/Date of Birth: Felisa Joe / 6/9/1932    Date: December 22, 2024     PCP: Tommy Gorman MD     Chief Complaint:   Chief Complaint   Patient presents with    Altered Mental Status     Patient found on floor by family , patient denies falling , per EMS patient in new onset afib . Patient denies any CP/SOB      HPI:   Felisa Joe is a 89 y.o. female who presents for evaluation of altered mental status after being found down by family.  General surgery is consulted for concerns of a anemia.  Patient denies any abdominal pain, nausea, vomiting, diarrhea, constipation, melena, hematochezia.  She states her last colonoscopy was many years ago and was normal.  She does not believe she has ever had an EGD.  She has never had any surgeries on her abdomen.  Does not take any blood thinners. Patient afebrile and hemodynamically stable. Hgb 7.2. CK 1095. K 2.9.     Patient Active Problem List   Diagnosis    Fall at home, initial encounter       No Known Allergies    Past Medical History:   Diagnosis Date    Hypertension        Past Surgical History:   Procedure Laterality Date    BLEPHAROPLASTY Bilateral 2011    CATARACT EXTRACTION W/  INTRAOCULAR LENS IMPLANT Bilateral        Social History     Socioeconomic History    Marital status:      Spouse name: Not on file    Number of children: Not on file    Years of education: Not on file    Highest education level: Not on file   Occupational History    Not on file   Tobacco Use    Smoking status: Never    Smokeless tobacco: Never   Substance and Sexual Activity    Alcohol use: Yes     Alcohol/week: 1.0 standard drink of alcohol     Types: 1 Glasses of wine per week     Comment: Daily    Drug use: Never    Sexual activity: Not on file   Other Topics Concern    Not on file   Social History Narrative    Not on file     Social Determinants of Health     Financial Resource Strain: Not on file   Food Insecurity: No

## 2024-12-22 NOTE — H&P
[facility]              [address] [place] [address]                           HISTORY & PHYSICAL      PATIENT NAME: SANDOR LEW               : 1932  MED REC NO: [id number]                        ROOM: [id number]  ACCOUNT NO: [id number]                       ADMIT DATE: 2024  PROVIDER: Tommy Emery DO      CHIEF COMPLAINT:  Weakness.    HISTORY OF PRESENT ILLNESS:  The patient is a 89-year-old  female who presented to the emergency room.  She was found on floor by family.  The patient unable to get up on her own.  She was seen in the emergency room.  Diagnostic evaluation was remarkable for hemoglobin 7.8; MCV 69.9; total ; troponin 95, repeat 95; proBNP 4832; BUN 21; creatinine 0.9.  UA, 1+ bacteria, negative leukocyte esterase.  CT head negative.  Chest x-ray negative.  EKG, sinus rhythm with PACs.  The patient was admitted for further evaluation and treatment.    MEDICATIONS PRIOR TO ADMISSION:  Maxzide.    PAST MEDICAL HISTORY:  Hypertension.    PAST SURGICAL HISTORY:  Cataract surgery, blepharoplasty.    SOCIAL HISTORY:  No tobacco.  Occasional alcohol.    REVIEW OF SYSTEMS:  Remarkable for above-stated chief complaint.    ALLERGIES:  NONE KNOWN.      PHYSICAL EXAMINATION:  GENERAL APPEARANCE:  Reveals a 89-year-old  female who is alert, cooperative, and a fair historian.  VITAL SIGNS:  On admission, temperature 98.1, pulse 90, respirations 18, and blood pressure 142/60.  HEENT:  Head normocephalic, atraumatic.  Eyes, pupils equal and reactive to light.  Extraocular muscles intact.  Fundi not well visualized.  Nose, no obstruction, polyp, or discharge noted.  Mouth, mucosa without lesion.  Pharynx, noninjected without exudate.  NECK:  Supple.  No JVD.  No thyromegaly.  No carotid bruits.  HEART:  Regular rate and rhythm without murmur.  LUNGS:  Clear to auscultation bilaterally.  ABDOMEN:  Positive bowel sounds.

## 2024-12-22 NOTE — ED PROVIDER NOTES
Department of Emergency Medicine   ED  Provider Note  Admit Date/RoomTime: 12/21/2024  6:47 PM  ED Room: [facility]          History of Present Illness:  12/21/24, Time: 10:32 PM EST  Chief Complaint   Patient presents with    Altered Mental Status     Patient found on floor by family , patient denies falling , per EMS patient in new onset afib . Patient denies any CP/SOB                 Felisa Joe is a 89 y.o. female presenting to the ED for fall.  Patient was found ground on the floor by her family.  It was reported that she is altered.  Family bedside, they states she is not altered, however, she is able typically to get up.  She was too weak to stand up.  On the ground for couple of hours.  She does live alone.  No fevers or chills.  No cough or sputum.  No paresthesias.  No other symptoms or complaints.    Review of Systems:   Pertinent positives and negatives are stated within HPI, all other systems reviewed and are negative.        --------------------------------------------- PAST HISTORY ---------------------------------------------  Past Medical History:  has a past medical history of Hypertension.    Past Surgical History:  has a past surgical history that includes Cataract removal with implant (Bilateral) and blepharoplasty (Bilateral, 2011).    Social History:  reports that she has never smoked. She has never used smokeless tobacco. She reports current alcohol use of about 1.0 standard drink of alcohol per week. She reports that she does not use drugs.    Family History: family history is not on file.. Unless otherwise noted, family history is non contributory    The patient’s home medications have been reviewed.    Allergies: Patient has no known allergies.        ---------------------------------------------------PHYSICAL EXAM--------------------------------------    Constitutional/General: Alert and oriented x3  Head: Normocephalic and atraumatic  Eyes: PERRL, EOMI, sclera non icteric  Mouth:

## 2024-12-23 LAB
ANION GAP SERPL CALCULATED.3IONS-SCNC: 9 MMOL/L (ref 7–16)
BUN SERPL-MCNC: 22 MG/DL (ref 6–23)
CALCIUM SERPL-MCNC: 7.8 MG/DL (ref 8.6–10.2)
CHLORIDE SERPL-SCNC: 109 MMOL/L (ref 98–107)
CK SERPL-CCNC: 796 U/L (ref 20–180)
CO2 SERPL-SCNC: 20 MMOL/L (ref 22–29)
CREAT SERPL-MCNC: 0.9 MG/DL (ref 0.5–1)
EKG ATRIAL RATE: 87 BPM
EKG P-R INTERVAL: 176 MS
EKG Q-T INTERVAL: 376 MS
EKG QRS DURATION: 72 MS
EKG QTC CALCULATION (BAZETT): 452 MS
EKG R AXIS: 22 DEGREES
EKG T AXIS: 35 DEGREES
EKG VENTRICULAR RATE: 87 BPM
ERYTHROCYTE [DISTWIDTH] IN BLOOD BY AUTOMATED COUNT: 19.8 % (ref 11.5–15)
GFR, ESTIMATED: 58 ML/MIN/1.73M2
GLUCOSE SERPL-MCNC: 83 MG/DL (ref 74–99)
HCT VFR BLD AUTO: 24.1 % (ref 34–48)
HCT VFR BLD AUTO: 28.1 % (ref 34–48)
HGB BLD-MCNC: 6.5 G/DL (ref 11.5–15.5)
HGB BLD-MCNC: 8.1 G/DL (ref 11.5–15.5)
MCH RBC QN AUTO: 20.2 PG (ref 26–35)
MCHC RBC AUTO-ENTMCNC: 27 G/DL (ref 32–34.5)
MCV RBC AUTO: 74.8 FL (ref 80–99.9)
PLATELET # BLD AUTO: 264 K/UL (ref 130–450)
PMV BLD AUTO: 10.9 FL (ref 7–12)
POTASSIUM SERPL-SCNC: 3.7 MMOL/L (ref 3.5–5)
RBC # BLD AUTO: 3.22 M/UL (ref 3.5–5.5)
SODIUM SERPL-SCNC: 138 MMOL/L (ref 132–146)
WBC OTHER # BLD: 6.4 K/UL (ref 4.5–11.5)

## 2024-12-23 PROCEDURE — 80048 BASIC METABOLIC PNL TOTAL CA: CPT

## 2024-12-23 PROCEDURE — 85027 COMPLETE CBC AUTOMATED: CPT

## 2024-12-23 PROCEDURE — 2580000003 HC RX 258: Performed by: INTERNAL MEDICINE

## 2024-12-23 PROCEDURE — 93010 ELECTROCARDIOGRAM REPORT: CPT | Performed by: INTERNAL MEDICINE

## 2024-12-23 PROCEDURE — 36430 TRANSFUSION BLD/BLD COMPNT: CPT

## 2024-12-23 PROCEDURE — 6360000002 HC RX W HCPCS: Performed by: STUDENT IN AN ORGANIZED HEALTH CARE EDUCATION/TRAINING PROGRAM

## 2024-12-23 PROCEDURE — 86850 RBC ANTIBODY SCREEN: CPT

## 2024-12-23 PROCEDURE — 86900 BLOOD TYPING SEROLOGIC ABO: CPT

## 2024-12-23 PROCEDURE — 30233N1 TRANSFUSION OF NONAUTOLOGOUS RED BLOOD CELLS INTO PERIPHERAL VEIN, PERCUTANEOUS APPROACH: ICD-10-PCS | Performed by: INTERNAL MEDICINE

## 2024-12-23 PROCEDURE — 36415 COLL VENOUS BLD VENIPUNCTURE: CPT

## 2024-12-23 PROCEDURE — 82550 ASSAY OF CK (CPK): CPT

## 2024-12-23 PROCEDURE — 2580000003 HC RX 258: Performed by: STUDENT IN AN ORGANIZED HEALTH CARE EDUCATION/TRAINING PROGRAM

## 2024-12-23 PROCEDURE — 86923 COMPATIBILITY TEST ELECTRIC: CPT

## 2024-12-23 PROCEDURE — P9016 RBC LEUKOCYTES REDUCED: HCPCS

## 2024-12-23 PROCEDURE — 2500000003 HC RX 250 WO HCPCS: Performed by: INTERNAL MEDICINE

## 2024-12-23 PROCEDURE — 85014 HEMATOCRIT: CPT

## 2024-12-23 PROCEDURE — 6360000002 HC RX W HCPCS: Performed by: INTERNAL MEDICINE

## 2024-12-23 PROCEDURE — 85018 HEMOGLOBIN: CPT

## 2024-12-23 PROCEDURE — 86901 BLOOD TYPING SEROLOGIC RH(D): CPT

## 2024-12-23 PROCEDURE — 1200000000 HC SEMI PRIVATE

## 2024-12-23 RX ORDER — SODIUM CHLORIDE 9 MG/ML
INJECTION, SOLUTION INTRAVENOUS PRN
Status: DISCONTINUED | OUTPATIENT
Start: 2024-12-23 | End: 2024-12-26 | Stop reason: HOSPADM

## 2024-12-23 RX ADMIN — ENOXAPARIN SODIUM 40 MG: 100 INJECTION SUBCUTANEOUS at 08:49

## 2024-12-23 RX ADMIN — SODIUM CHLORIDE, PRESERVATIVE FREE 10 ML: 5 INJECTION INTRAVENOUS at 08:50

## 2024-12-23 RX ADMIN — PANTOPRAZOLE SODIUM 40 MG: 40 INJECTION, POWDER, FOR SOLUTION INTRAVENOUS at 18:16

## 2024-12-23 RX ADMIN — PANTOPRAZOLE SODIUM 40 MG: 40 INJECTION, POWDER, FOR SOLUTION INTRAVENOUS at 08:49

## 2024-12-23 RX ADMIN — SODIUM CHLORIDE: 9 INJECTION, SOLUTION INTRAVENOUS at 14:34

## 2024-12-23 NOTE — PLAN OF CARE
Problem: Skin/Tissue Integrity  Goal: Absence of new skin breakdown  Description: 1.  Monitor for areas of redness and/or skin breakdown  2.  Assess vascular access sites hourly  3.  Every 4-6 hours minimum:  Change oxygen saturation probe site  4.  Every 4-6 hours:  If on nasal continuous positive airway pressure, respiratory therapy assess nares and determine need for appliance change or resting period.  12/23/2024 0856 by Shannan Cruz RN  Outcome: Progressing  12/23/2024 0611 by Sia Prescott RN  Outcome: Progressing     Problem: ABCDS Injury Assessment  Goal: Absence of physical injury  12/23/2024 0856 by Shannan Cruz RN  Outcome: Progressing  12/23/2024 0611 by Sia Prescott RN  Outcome: Progressing     Problem: Safety - Adult  Goal: Free from fall injury  12/23/2024 0856 by Shannan Cruz RN  Outcome: Progressing  12/23/2024 0611 by Sia Prescott RN  Outcome: Progressing

## 2024-12-23 NOTE — ACP (ADVANCE CARE PLANNING)
Advance Care Planning   Healthcare Decision Maker:    Primary Decision Maker: abad garces  Felecia - [phone]    Click here to complete Healthcare Decision Makers including selection of the Healthcare Decision Maker Relationship (ie \"Primary\").          Electronically signed by Ky Ramirez RN on 12/23/2024 at 12:40 PM

## 2024-12-23 NOTE — CARE COORDINATION
12/23/24 Transition of care:  Pt admitted from ED after fall at home General Surgery consulted Hgb 6.5 PT 16/24 walked 75ft X2 with ww Met with pt to discuss transition of care and discharge preference Pt daughter is at bedside Pt lives by herself in a condo with 1 stair to enter Pt is independent with ADLs and uses no AD for ambulation Pt has no HHC or WINNIE history PCP is Lindsey RX is Jacqu[facility] Child Plan is home with no needs Asked if pt wanted WW for home pt stated she did not need a walker Daughter will transport home CM/SW to follow Electronically signed by Ky Ramirez RN on 12/23/2024 at 1:30 PM     Case Management Assessment  Initial Evaluation    Date/Time of Evaluation: 12/23/2024 1:30 PM  Assessment Completed by: Ky Ramirez RN    If patient is discharged prior to next notation, then this note serves as note for discharge by case management.    Patient Name: Felisa Joe                   YOB: 1932  Diagnosis: Dehydration [E86.0]  Fall, initial encounter [W19.XXXA]  Fall at home, initial encounter [W19.XXXA, Y92.009]                   Date / Time: 12/21/2024  6:47 PM    Patient Admission Status: Inpatient   Readmission Risk (Low < 19, Mod (19-27), High > 27): Readmission Risk Score: 12.4    Current PCP: Tommy Gorman MD  PCP verified by ? Yes    Chart Reviewed: Yes      History Provided by: Patient  Patient Orientation: Alert and Oriented    Patient Cognition: Alert    Hospitalization in the last 30 days (Readmission):  No    If yes, Readmission Assessment in  Navigator will be completed.    Advance Directives:      Code Status: Full Code   Patient's Primary Decision Maker is: Legal Next of Kin    Primary Decision Maker: abad garces - Child - [phone]    Discharge Planning:    Patient lives with: Alone Type of Home: Other (Comment) (condo)  Primary Care Giver: Self  Patient Support Systems include: Children, Family Members   Current Financial resources:

## 2024-12-24 LAB
ABO/RH: NORMAL
ANTIBODY SCREEN: NEGATIVE
ARM BAND NUMBER: NORMAL
BLOOD BANK BLOOD PRODUCT EXPIRATION DATE: NORMAL
BLOOD BANK DISPENSE STATUS: NORMAL
BLOOD BANK ISBT PRODUCT BLOOD TYPE: 5100
BLOOD BANK PRODUCT CODE: NORMAL
BLOOD BANK SAMPLE EXPIRATION: NORMAL
BLOOD BANK UNIT TYPE AND RH: NORMAL
BPU ID: NORMAL
CK SERPL-CCNC: 497 U/L (ref 20–180)
COMPONENT: NORMAL
CROSSMATCH RESULT: NORMAL
ERYTHROCYTE [DISTWIDTH] IN BLOOD BY AUTOMATED COUNT: 19.8 % (ref 11.5–15)
HCT VFR BLD AUTO: 24.5 % (ref 34–48)
HGB BLD-MCNC: 7.3 G/DL (ref 11.5–15.5)
MCH RBC QN AUTO: 21.4 PG (ref 26–35)
MCHC RBC AUTO-ENTMCNC: 29.8 G/DL (ref 32–34.5)
MCV RBC AUTO: 71.8 FL (ref 80–99.9)
PLATELET # BLD AUTO: 261 K/UL (ref 130–450)
PMV BLD AUTO: 10.7 FL (ref 7–12)
RBC # BLD AUTO: 3.41 M/UL (ref 3.5–5.5)
TRANSFUSION STATUS: NORMAL
UNIT DIVISION: 0
UNIT ISSUE DATE/TIME: NORMAL
WBC OTHER # BLD: 7.1 K/UL (ref 4.5–11.5)

## 2024-12-24 PROCEDURE — 2580000003 HC RX 258: Performed by: INTERNAL MEDICINE

## 2024-12-24 PROCEDURE — 1200000000 HC SEMI PRIVATE

## 2024-12-24 PROCEDURE — 82550 ASSAY OF CK (CPK): CPT

## 2024-12-24 PROCEDURE — 6370000000 HC RX 637 (ALT 250 FOR IP): Performed by: INTERNAL MEDICINE

## 2024-12-24 PROCEDURE — 85027 COMPLETE CBC AUTOMATED: CPT

## 2024-12-24 PROCEDURE — 2580000003 HC RX 258: Performed by: STUDENT IN AN ORGANIZED HEALTH CARE EDUCATION/TRAINING PROGRAM

## 2024-12-24 PROCEDURE — 2500000003 HC RX 250 WO HCPCS: Performed by: INTERNAL MEDICINE

## 2024-12-24 PROCEDURE — 6360000002 HC RX W HCPCS: Performed by: STUDENT IN AN ORGANIZED HEALTH CARE EDUCATION/TRAINING PROGRAM

## 2024-12-24 PROCEDURE — 36415 COLL VENOUS BLD VENIPUNCTURE: CPT

## 2024-12-24 PROCEDURE — 6360000002 HC RX W HCPCS: Performed by: INTERNAL MEDICINE

## 2024-12-24 RX ORDER — SODIUM CHLORIDE 9 MG/ML
INJECTION, SOLUTION INTRAVENOUS CONTINUOUS
Status: DISCONTINUED | OUTPATIENT
Start: 2024-12-24 | End: 2024-12-25

## 2024-12-24 RX ORDER — FERROUS SULFATE 325(65) MG
325 TABLET ORAL 2 TIMES DAILY WITH MEALS
Status: DISCONTINUED | OUTPATIENT
Start: 2024-12-24 | End: 2024-12-26 | Stop reason: HOSPADM

## 2024-12-24 RX ADMIN — SODIUM CHLORIDE, PRESERVATIVE FREE 10 ML: 5 INJECTION INTRAVENOUS at 08:00

## 2024-12-24 RX ADMIN — FERROUS SULFATE TAB 325 MG (65 MG ELEMENTAL FE) 325 MG: 325 (65 FE) TAB at 18:05

## 2024-12-24 RX ADMIN — PANTOPRAZOLE SODIUM 40 MG: 40 INJECTION, POWDER, FOR SOLUTION INTRAVENOUS at 08:00

## 2024-12-24 RX ADMIN — ENOXAPARIN SODIUM 40 MG: 100 INJECTION SUBCUTANEOUS at 08:01

## 2024-12-24 RX ADMIN — SODIUM CHLORIDE: 9 INJECTION, SOLUTION INTRAVENOUS at 15:14

## 2024-12-24 RX ADMIN — PANTOPRAZOLE SODIUM 40 MG: 40 INJECTION, POWDER, FOR SOLUTION INTRAVENOUS at 18:04

## 2024-12-24 ASSESSMENT — PAIN SCALES - GENERAL: PAINLEVEL_OUTOF10: 0

## 2024-12-24 NOTE — PLAN OF CARE
Problem: Pain  Goal: Verbalizes/displays adequate comfort level or baseline comfort level  12/24/2024 0028 by Shilpa Warner RN  Outcome: Progressing     Problem: Skin/Tissue Integrity  Goal: Absence of new skin breakdown  Description: 1.  Monitor for areas of redness and/or skin breakdown  2.  Assess vascular access sites hourly  3.  Every 4-6 hours minimum:  Change oxygen saturation probe site  4.  Every 4-6 hours:  If on nasal continuous positive airway pressure, respiratory therapy assess nares and determine need for appliance change or resting period.  12/24/2024 0028 by Shilpa Warner RN  Outcome: Progressing     Problem: ABCDS Injury Assessment  Goal: Absence of physical injury  12/24/2024 0028 by Shilpa Warner RN  Outcome: Progressing     Problem: Safety - Adult  Goal: Free from fall injury  12/24/2024 0028 by Shilpa Warner RN  Outcome: Progressing     Problem: ABCDS Injury Assessment  Goal: Absence of physical injury  12/24/2024 0028 by Shilpa Warner RN  Outcome: Progressing  12/24/2024 0028 by Shilpa Warner RN  Outcome: Not Progressing     Problem: Safety - Adult  Goal: Free from fall injury  12/24/2024 0028 by Shilpa Warner RN  Outcome: Progressing  12/24/2024 0028 by Shilpa Warner RN  Outcome: Not Progressing

## 2024-12-24 NOTE — CARE COORDINATION
Social Work/Case Management Transition of Care Planning (Tracie Leiva -[phone]):  Per report and chart review, patient received 1 unit PRBC on 12/23 due to Hgb 6.5.  General surgery is following.  No plans for endoscopy.  Patient remains on IVF.  Met with patient at bedside. Discharge plan is to home with no needs.  Family will transport.  CM/SW will follow.  Tracie Leiva, ALEX  12/24/2024

## 2024-12-24 NOTE — PLAN OF CARE
Problem: Pain  Goal: Verbalizes/displays adequate comfort level or baseline comfort level  12/24/2024 0910 by Mallory Feldman RN  Outcome: Progressing  Flowsheets (Taken 12/24/2024 0900)  Verbalizes/displays adequate comfort level or baseline comfort level: Encourage patient to monitor pain and request assistance  12/24/2024 0028 by Shilpa Warner RN  Outcome: Progressing  12/24/2024 0028 by Shilpa Warner RN  Outcome: Progressing     Problem: Skin/Tissue Integrity  Goal: Absence of new skin breakdown  Description: 1.  Monitor for areas of redness and/or skin breakdown  2.  Assess vascular access sites hourly  3.  Every 4-6 hours minimum:  Change oxygen saturation probe site  4.  Every 4-6 hours:  If on nasal continuous positive airway pressure, respiratory therapy assess nares and determine need for appliance change or resting period.  12/24/2024 0910 by Mallory Feldman RN  Outcome: Progressing  12/24/2024 0028 by Shilpa Warner RN  Outcome: Progressing  12/24/2024 0028 by Shilpa Warner RN  Outcome: Progressing     Problem: ABCDS Injury Assessment  Goal: Absence of physical injury  12/24/2024 0910 by Mallory Feldman RN  Outcome: Progressing  12/24/2024 0028 by Shilpa Warner RN  Outcome: Progressing  12/24/2024 0028 by Shilpa Warner RN  Outcome: Not Progressing     Problem: Safety - Adult  Goal: Free from fall injury  12/24/2024 0910 by Mallory Feldman RN  Outcome: Progressing  12/24/2024 0028 by Shilpa Warner RN  Outcome: Progressing  12/24/2024 0028 by Shilpa Warner RN  Outcome: Not Progressing     Problem: ABCDS Injury Assessment  Goal: Absence of physical injury  12/24/2024 0910 by Mallory Feldman RN  Outcome: Progressing  12/24/2024 0028 by Shilpa Warner RN  Outcome: Progressing  12/24/2024 0028 by Shilpa Warner RN  Outcome: Not Progressing     Problem: Safety - Adult  Goal: Free from fall injury  12/24/2024 0910 by Mallory Feldman RN  Outcome:

## 2024-12-25 PROBLEM — K30 GASTROINTESTINAL DISTRESS: Status: ACTIVE | Noted: 2024-12-25

## 2024-12-25 PROBLEM — I44.1 MOBITZ TYPE I WENCKEBACH ATRIOVENTRICULAR BLOCK: Status: ACTIVE | Noted: 2024-12-25

## 2024-12-25 PROBLEM — D64.9 ANEMIA: Status: ACTIVE | Noted: 2024-12-25

## 2024-12-25 LAB
CK SERPL-CCNC: 224 U/L (ref 20–180)
ERYTHROCYTE [DISTWIDTH] IN BLOOD BY AUTOMATED COUNT: 20.4 % (ref 11.5–15)
HCT VFR BLD AUTO: 25 % (ref 34–48)
HGB BLD-MCNC: 7.2 G/DL (ref 11.5–15.5)
MCH RBC QN AUTO: 21 PG (ref 26–35)
MCHC RBC AUTO-ENTMCNC: 28.8 G/DL (ref 32–34.5)
MCV RBC AUTO: 72.9 FL (ref 80–99.9)
PLATELET # BLD AUTO: 245 K/UL (ref 130–450)
PMV BLD AUTO: 10.4 FL (ref 7–12)
RBC # BLD AUTO: 3.43 M/UL (ref 3.5–5.5)
WBC OTHER # BLD: 6.4 K/UL (ref 4.5–11.5)

## 2024-12-25 PROCEDURE — 6370000000 HC RX 637 (ALT 250 FOR IP): Performed by: INTERNAL MEDICINE

## 2024-12-25 PROCEDURE — 36415 COLL VENOUS BLD VENIPUNCTURE: CPT

## 2024-12-25 PROCEDURE — 99222 1ST HOSP IP/OBS MODERATE 55: CPT | Performed by: STUDENT IN AN ORGANIZED HEALTH CARE EDUCATION/TRAINING PROGRAM

## 2024-12-25 PROCEDURE — 82550 ASSAY OF CK (CPK): CPT

## 2024-12-25 PROCEDURE — 2500000003 HC RX 250 WO HCPCS: Performed by: INTERNAL MEDICINE

## 2024-12-25 PROCEDURE — 1200000000 HC SEMI PRIVATE

## 2024-12-25 PROCEDURE — 6360000002 HC RX W HCPCS: Performed by: INTERNAL MEDICINE

## 2024-12-25 PROCEDURE — 2580000003 HC RX 258: Performed by: STUDENT IN AN ORGANIZED HEALTH CARE EDUCATION/TRAINING PROGRAM

## 2024-12-25 PROCEDURE — 2580000003 HC RX 258: Performed by: INTERNAL MEDICINE

## 2024-12-25 PROCEDURE — 6360000002 HC RX W HCPCS: Performed by: STUDENT IN AN ORGANIZED HEALTH CARE EDUCATION/TRAINING PROGRAM

## 2024-12-25 PROCEDURE — 85027 COMPLETE CBC AUTOMATED: CPT

## 2024-12-25 RX ADMIN — FERROUS SULFATE TAB 325 MG (65 MG ELEMENTAL FE) 325 MG: 325 (65 FE) TAB at 08:25

## 2024-12-25 RX ADMIN — ONDANSETRON 4 MG: 2 INJECTION INTRAMUSCULAR; INTRAVENOUS at 08:33

## 2024-12-25 RX ADMIN — SODIUM CHLORIDE: 9 INJECTION, SOLUTION INTRAVENOUS at 08:25

## 2024-12-25 RX ADMIN — PANTOPRAZOLE SODIUM 40 MG: 40 INJECTION, POWDER, FOR SOLUTION INTRAVENOUS at 18:18

## 2024-12-25 RX ADMIN — ENOXAPARIN SODIUM 40 MG: 100 INJECTION SUBCUTANEOUS at 08:39

## 2024-12-25 RX ADMIN — SODIUM CHLORIDE, PRESERVATIVE FREE 10 ML: 5 INJECTION INTRAVENOUS at 20:27

## 2024-12-25 RX ADMIN — FERROUS SULFATE TAB 325 MG (65 MG ELEMENTAL FE) 325 MG: 325 (65 FE) TAB at 17:29

## 2024-12-25 RX ADMIN — PANTOPRAZOLE SODIUM 40 MG: 40 INJECTION, POWDER, FOR SOLUTION INTRAVENOUS at 08:37

## 2024-12-25 NOTE — PLAN OF CARE
Problem: Pain  Goal: Verbalizes/displays adequate comfort level or baseline comfort level  12/24/2024 2238 by Alo Izquierdo RN  Outcome: Progressing  12/24/2024 0910 by Mallory Feldman, KAREEM  Outcome: Progressing  Flowsheets (Taken 12/24/2024 0900)  Verbalizes/displays adequate comfort level or baseline comfort level: Encourage patient to monitor pain and request assistance     Problem: Safety - Adult  Goal: Free from fall injury  12/24/2024 2238 by Alo Izquierdo RN  Outcome: Progressing  12/24/2024 0910 by Mallory Feldman, KAREEM  Outcome: Progressing      Simple / Intermediate / Complex Repair - Final Wound Length In Cm: 3.5

## 2024-12-25 NOTE — CONSULTS
[facility] CARDIOLOGY  CARDIAC ELECTROPHYSIOLOGY DEPARTMENT/DIVISION OF CARDIOLOGY  Inpatient consultation Report  PATIENT: Felisa Joe  MEDICAL RECORD NUMBER: [id number]  DATE OF SERVICE:  12/25/2024  ATTENDING ELECTROPHYSIOLOGIST:  Connor Vang DO   REFERRING PHYSICIAN: No ref. provider found and Tommy Gorman MD  CHIEF COMPLAINT: bradycardia    HPI: Felisa Joe is a 89 y.o. female with a history of HTN, poor hearing, and dementia.  She is managed by PCP with HCTZ 25 mg daily and triamterene 37.5 mg daily.  On 12/21/2024, patient was found on floor of her home by her daughter.  Per the daughter, there was a trail of feces leading to the area where she found her mother laying on the floor.  Patient's daughter suspects patient slipped on the floor due to feces.  Patient is a poor historian and has no memory of the event.  She was diagnosed with dehydration and anemia of uncertain etiology.  Patient also complains of intermittent GI distress/nausea/emesis.  General surgery was consulted, who recommended PRBCs as needed and PPI.  There are currently no plans for endoscopy.  On telemetry, she was noted to have episode of second-degree type I AV block during nausea/emesis this morning.  EP service is now consulted for further evaluation/management of dysrhythmias.  Patient denies any complaints at this time.    Prior cardiac testing:  - None    Past Medical History:   Diagnosis Date    Hypertension      Past Surgical History:   Procedure Laterality Date    BLEPHAROPLASTY Bilateral 2011    CATARACT EXTRACTION W/  INTRAOCULAR LENS IMPLANT Bilateral       No family history on file.  There is no family history of sudden cardiac arrest    Social History     Tobacco Use    Smoking status: Never    Smokeless tobacco: Never   Substance Use Topics    Alcohol use: Yes     Alcohol/week: 1.0 standard drink of alcohol     Types: 1 Glasses of wine per week     Comment: Daily       Current

## 2024-12-25 NOTE — PLAN OF CARE
Problem: Pain  Goal: Verbalizes/displays adequate comfort level or baseline comfort level  Outcome: Progressing     Problem: Skin/Tissue Integrity  Goal: Absence of new skin breakdown  Description: 1.  Monitor for areas of redness and/or skin breakdown  2.  Assess vascular access sites hourly  3.  Every 4-6 hours minimum:  Change oxygen saturation probe site  4.  Every 4-6 hours:  If on nasal continuous positive airway pressure, respiratory therapy assess nares and determine need for appliance change or resting period.  Outcome: Progressing     Problem: ABCDS Injury Assessment  Goal: Absence of physical injury  Outcome: Progressing  Flowsheets (Taken 12/25/2024 1746)  Absence of Physical Injury: Implement safety measures based on patient assessment     Problem: Safety - Adult  Goal: Free from fall injury  Outcome: Progressing

## 2024-12-26 VITALS
DIASTOLIC BLOOD PRESSURE: 56 MMHG | SYSTOLIC BLOOD PRESSURE: 138 MMHG | HEART RATE: 75 BPM | OXYGEN SATURATION: 94 % | TEMPERATURE: 97.3 F | HEIGHT: 62 IN | RESPIRATION RATE: 18 BRPM | BODY MASS INDEX: 31.52 KG/M2 | WEIGHT: 171.3 LBS

## 2024-12-26 LAB
CK SERPL-CCNC: 145 U/L (ref 20–180)
ERYTHROCYTE [DISTWIDTH] IN BLOOD BY AUTOMATED COUNT: 20.8 % (ref 11.5–15)
HCT VFR BLD AUTO: 25.3 % (ref 34–48)
HGB BLD-MCNC: 7.6 G/DL (ref 11.5–15.5)
MCH RBC QN AUTO: 21.9 PG (ref 26–35)
MCHC RBC AUTO-ENTMCNC: 30 G/DL (ref 32–34.5)
MCV RBC AUTO: 72.9 FL (ref 80–99.9)
PLATELET # BLD AUTO: 266 K/UL (ref 130–450)
PMV BLD AUTO: 10.5 FL (ref 7–12)
RBC # BLD AUTO: 3.47 M/UL (ref 3.5–5.5)
WBC OTHER # BLD: 8 K/UL (ref 4.5–11.5)

## 2024-12-26 PROCEDURE — 85027 COMPLETE CBC AUTOMATED: CPT

## 2024-12-26 PROCEDURE — 2500000003 HC RX 250 WO HCPCS: Performed by: INTERNAL MEDICINE

## 2024-12-26 PROCEDURE — 82550 ASSAY OF CK (CPK): CPT

## 2024-12-26 PROCEDURE — 97530 THERAPEUTIC ACTIVITIES: CPT

## 2024-12-26 PROCEDURE — 6360000002 HC RX W HCPCS: Performed by: INTERNAL MEDICINE

## 2024-12-26 PROCEDURE — 6370000000 HC RX 637 (ALT 250 FOR IP): Performed by: INTERNAL MEDICINE

## 2024-12-26 PROCEDURE — 36415 COLL VENOUS BLD VENIPUNCTURE: CPT

## 2024-12-26 PROCEDURE — 97535 SELF CARE MNGMENT TRAINING: CPT

## 2024-12-26 RX ORDER — PANTOPRAZOLE SODIUM 40 MG/1
40 TABLET, DELAYED RELEASE ORAL
Qty: 60 TABLET | Refills: 0 | Status: SHIPPED | OUTPATIENT
Start: 2024-12-26

## 2024-12-26 RX ORDER — FERROUS SULFATE 325(65) MG
325 TABLET ORAL 2 TIMES DAILY WITH MEALS
Qty: 60 TABLET | Refills: 0 | Status: SHIPPED | OUTPATIENT
Start: 2024-12-26

## 2024-12-26 RX ORDER — PANTOPRAZOLE SODIUM 40 MG/1
40 TABLET, DELAYED RELEASE ORAL
Status: DISCONTINUED | OUTPATIENT
Start: 2024-12-26 | End: 2024-12-26 | Stop reason: HOSPADM

## 2024-12-26 RX ADMIN — FERROUS SULFATE TAB 325 MG (65 MG ELEMENTAL FE) 325 MG: 325 (65 FE) TAB at 07:54

## 2024-12-26 RX ADMIN — ENOXAPARIN SODIUM 40 MG: 100 INJECTION SUBCUTANEOUS at 07:54

## 2024-12-26 RX ADMIN — SODIUM CHLORIDE, PRESERVATIVE FREE 10 ML: 5 INJECTION INTRAVENOUS at 07:54

## 2024-12-26 RX ADMIN — PANTOPRAZOLE SODIUM 40 MG: 40 TABLET, DELAYED RELEASE ORAL at 07:54

## 2024-12-26 NOTE — CARE COORDINATION
Social Work/Case Management Transition of Care Planning (Tracie Leiva [facility] [phone]):  Per report and chart review, EP was consulted for bradycardia.  No indication for pacemaker. They signed off.  PT/OT to eval.  Met with patient at bedside.  Discharge plan is to home with no needs. She declines home health care. Family will transport. CM/SW will follow. ALEX Urias  12/26/2024    Update:  Discharge order noted.  Discharge plan is to home with no  needs.  Family will transport.  ALEX Urias  12/26/2024

## 2024-12-26 NOTE — PLAN OF CARE
Problem: Pain  Goal: Verbalizes/displays adequate comfort level or baseline comfort level  12/26/2024 1528 by Priscilla Osman RN  Outcome: Completed  12/26/2024 1528 by Priscilla Osman RN  Outcome: Progressing     Problem: Skin/Tissue Integrity  Goal: Absence of new skin breakdown  Description: 1.  Monitor for areas of redness and/or skin breakdown  2.  Assess vascular access sites hourly  3.  Every 4-6 hours minimum:  Change oxygen saturation probe site  4.  Every 4-6 hours:  If on nasal continuous positive airway pressure, respiratory therapy assess nares and determine need for appliance change or resting period.  12/26/2024 1528 by Priscilla Osman RN  Outcome: Completed  12/26/2024 1528 by Priscilla Osman RN  Outcome: Progressing     Problem: ABCDS Injury Assessment  Goal: Absence of physical injury  12/26/2024 1528 by Priscilla Osman RN  Outcome: Completed  12/26/2024 1528 by Priscilla Osman RN  Outcome: Progressing     Problem: Safety - Adult  Goal: Free from fall injury  12/26/2024 1528 by Priscilla Osman RN  Outcome: Completed  12/26/2024 1528 by Priscilla Osman RN  Outcome: Progressing

## 2024-12-26 NOTE — PLAN OF CARE
Problem: Pain  Goal: Verbalizes/displays adequate comfort level or baseline comfort level  12/25/2024 2235 by Alo Izquierdo RN  Outcome: Progressing  12/25/2024 1805 by Priscilla Osman RN  Outcome: Progressing     Problem: Safety - Adult  Goal: Free from fall injury  12/25/2024 2235 by Alo Izquierdo RN  Outcome: Progressing  12/25/2024 1805 by Priscilla Osman RN  Outcome: Progressing

## 2024-12-26 NOTE — PROGRESS NOTES
[facility] Medicine    Subjective:  pt alert conversive      Current Facility-Administered Medications:     0.9 % sodium chloride infusion, , IntraVENous, PRN, Alessandro Means MD    pantoprazole (PROTONIX) 40 mg in sodium chloride (PF) 0.9 % 10 mL injection, 40 mg, IntraVENous, Q12H, Alessandro Means MD    sodium chloride flush 0.9 % injection 5-40 mL, 5-40 mL, IntraVENous, 2 times per day, Tommy Emery DO, 10 mL at 12/22/24 0920    sodium chloride flush 0.9 % injection 5-40 mL, 5-40 mL, IntraVENous, PRN, Tommy Emery DO    0.9 % sodium chloride infusion, , IntraVENous, PRN, Tommy Emery DO    potassium chloride (KLOR-CON M) extended release tablet 40 mEq, 40 mEq, Oral, PRN **OR** potassium bicarb-citric acid (EFFER-K) effervescent tablet 40 mEq, 40 mEq, Oral, PRN **OR** potassium chloride 10 mEq/100 mL IVPB (Peripheral Line), 10 mEq, IntraVENous, PRN, Tommy Emery DO, Last Rate: 100 mL/hr at 12/22/24 2316, 10 mEq at 12/22/24 2316    magnesium sulfate 2000 mg in 50 mL IVPB premix, 2,000 mg, IntraVENous, PRN, Tommy Emery DO    enoxaparin (LOVENOX) injection 40 mg, 40 mg, SubCUTAneous, Daily, Tommy Emery DO, 40 mg at 12/22/24 0920    ondansetron (ZOFRAN-ODT) disintegrating tablet 4 mg, 4 mg, Oral, Q8H PRN **OR** ondansetron (ZOFRAN) injection 4 mg, 4 mg, IntraVENous, Q6H PRN, Tommy Emery DO    polyethylene glycol (GLYCOLAX) packet 17 g, 17 g, Oral, Daily PRN, Tommy Emery DO    acetaminophen (TYLENOL) tablet 650 mg, 650 mg, Oral, Q6H PRN, 650 mg at 12/22/24 1519 **OR** acetaminophen (TYLENOL) suppository 650 mg, 650 mg, Rectal, Q6H PRN, Tommy Emery DO    0.9 % sodium chloride infusion, , IntraVENous, Continuous, Tommy Emery, , Last Rate: 75 mL/hr at 12/22/24 2315, New Bag at 12/22/24 2315    Objective:    BP (!) 129/54   Pulse 73   Temp 96.8 °F (36 °C) (Temporal)   Resp 18   Ht 1.575 m (5' 2\")   Wt 70.7 kg (155 lb 13.8 oz)   SpO2 95%   BMI 
  Hospital Medicine    Subjective:  pt alert conversive / pt with bradycardia/? arrhythmia      Current Facility-Administered Medications:     ferrous sulfate (IRON 325) tablet 325 mg, 325 mg, Oral, BID WC, Tommy Emery DO, 325 mg at 12/25/24 0825    0.9 % sodium chloride infusion, , IntraVENous, PRN, Alessandro Means MD    pantoprazole (PROTONIX) 40 mg in sodium chloride (PF) 0.9 % 10 mL injection, 40 mg, IntraVENous, Q12H, Alessandro Means MD, 40 mg at 12/25/24 0837    sodium chloride flush 0.9 % injection 5-40 mL, 5-40 mL, IntraVENous, 2 times per day, Tommy Emery DO, 10 mL at 12/24/24 0800    sodium chloride flush 0.9 % injection 5-40 mL, 5-40 mL, IntraVENous, PRN, Tommy Emery DO    0.9 % sodium chloride infusion, , IntraVENous, PRN, Tommy Emery DO    potassium chloride (KLOR-CON M) extended release tablet 40 mEq, 40 mEq, Oral, PRN **OR** potassium bicarb-citric acid (EFFER-K) effervescent tablet 40 mEq, 40 mEq, Oral, PRN **OR** potassium chloride 10 mEq/100 mL IVPB (Peripheral Line), 10 mEq, IntraVENous, PRN, Tommy Emery DO, Last Rate: 100 mL/hr at 12/22/24 2316, 10 mEq at 12/22/24 2316    magnesium sulfate 2000 mg in 50 mL IVPB premix, 2,000 mg, IntraVENous, PRN, Tommy Emery DO    enoxaparin (LOVENOX) injection 40 mg, 40 mg, SubCUTAneous, Daily, Tommy Emery DO, 40 mg at 12/25/24 0839    ondansetron (ZOFRAN-ODT) disintegrating tablet 4 mg, 4 mg, Oral, Q8H PRN **OR** ondansetron (ZOFRAN) injection 4 mg, 4 mg, IntraVENous, Q6H PRN, Tommy Emery DO, 4 mg at 12/25/24 0833    polyethylene glycol (GLYCOLAX) packet 17 g, 17 g, Oral, Daily PRN, Malmer, Tommy M, DO    acetaminophen (TYLENOL) tablet 650 mg, 650 mg, Oral, Q6H PRN, 650 mg at 12/22/24 1519 **OR** acetaminophen (TYLENOL) suppository 650 mg, 650 mg, Rectal, Q6H PRN, Tommy Emery, DO    Objective:    BP (!) 157/49   Pulse 72   Temp 98.9 °F (37.2 °C) (Oral)   Resp 20   Ht 1.575 m (5' 2\")   Wt 
  [facility] Medicine    Subjective:  pt alert conversive      Current Facility-Administered Medications:     ferrous sulfate (IRON 325) tablet 325 mg, 325 mg, Oral, BID WC, Tommy Emery DO, 325 mg at 12/25/24 1729    0.9 % sodium chloride infusion, , IntraVENous, PRN, Alessandro Means MD    pantoprazole (PROTONIX) 40 mg in sodium chloride (PF) 0.9 % 10 mL injection, 40 mg, IntraVENous, Q12H, Alessandro Means MD, 40 mg at 12/25/24 1818    sodium chloride flush 0.9 % injection 5-40 mL, 5-40 mL, IntraVENous, 2 times per day, Tommy Emery DO, 10 mL at 12/25/24 2027    sodium chloride flush 0.9 % injection 5-40 mL, 5-40 mL, IntraVENous, PRN, Tommy Emery DO    0.9 % sodium chloride infusion, , IntraVENous, PRN, Tommy Emery DO    potassium chloride (KLOR-CON M) extended release tablet 40 mEq, 40 mEq, Oral, PRN **OR** potassium bicarb-citric acid (EFFER-K) effervescent tablet 40 mEq, 40 mEq, Oral, PRN **OR** potassium chloride 10 mEq/100 mL IVPB (Peripheral Line), 10 mEq, IntraVENous, PRN, Tommy Emery DO, Last Rate: 100 mL/hr at 12/22/24 2316, 10 mEq at 12/22/24 2316    magnesium sulfate 2000 mg in 50 mL IVPB premix, 2,000 mg, IntraVENous, PRN, Tommy Emery DO    enoxaparin (LOVENOX) injection 40 mg, 40 mg, SubCUTAneous, Daily, Tommy Eemry DO, 40 mg at 12/25/24 0839    ondansetron (ZOFRAN-ODT) disintegrating tablet 4 mg, 4 mg, Oral, Q8H PRN **OR** ondansetron (ZOFRAN) injection 4 mg, 4 mg, IntraVENous, Q6H PRN, Tommy Emery DO, 4 mg at 12/25/24 0833    polyethylene glycol (GLYCOLAX) packet 17 g, 17 g, Oral, Daily PRN, Tommy Emery DO    acetaminophen (TYLENOL) tablet 650 mg, 650 mg, Oral, Q6H PRN, 650 mg at 12/22/24 1519 **OR** acetaminophen (TYLENOL) suppository 650 mg, 650 mg, Rectal, Q6H PRN, Tommy Emery, DO    Objective:    BP (!) 124/56   Pulse 64   Temp 98.3 °F (36.8 °C) (Oral)   Resp 18   Ht 1.575 m (5' 2\")   Wt 77.7 kg (171 lb 4.8 oz)   SpO2 97%  
  [facility] Medicine    Subjective:  pt alert conversive jamin diet      Current Facility-Administered Medications:     0.9 % sodium chloride infusion, , IntraVENous, PRN, Alessandro Means MD    pantoprazole (PROTONIX) 40 mg in sodium chloride (PF) 0.9 % 10 mL injection, 40 mg, IntraVENous, Q12H, Alessandro Means MD, 40 mg at 12/23/24 1816    sodium chloride flush 0.9 % injection 5-40 mL, 5-40 mL, IntraVENous, 2 times per day, Tommy Emery DO, 10 mL at 12/23/24 0850    sodium chloride flush 0.9 % injection 5-40 mL, 5-40 mL, IntraVENous, PRN, Tommy Emery DO    0.9 % sodium chloride infusion, , IntraVENous, PRN, Tommy Emery DO    potassium chloride (KLOR-CON M) extended release tablet 40 mEq, 40 mEq, Oral, PRN **OR** potassium bicarb-citric acid (EFFER-K) effervescent tablet 40 mEq, 40 mEq, Oral, PRN **OR** potassium chloride 10 mEq/100 mL IVPB (Peripheral Line), 10 mEq, IntraVENous, PRN, Tommy Emery DO, Last Rate: 100 mL/hr at 12/22/24 2316, 10 mEq at 12/22/24 2316    magnesium sulfate 2000 mg in 50 mL IVPB premix, 2,000 mg, IntraVENous, PRN, Tommy Emery DO    enoxaparin (LOVENOX) injection 40 mg, 40 mg, SubCUTAneous, Daily, Tommy Emery DO, 40 mg at 12/23/24 0849    ondansetron (ZOFRAN-ODT) disintegrating tablet 4 mg, 4 mg, Oral, Q8H PRN **OR** ondansetron (ZOFRAN) injection 4 mg, 4 mg, IntraVENous, Q6H PRN, Tommy Emery DO    polyethylene glycol (GLYCOLAX) packet 17 g, 17 g, Oral, Daily PRN, Tommy Emery DO    acetaminophen (TYLENOL) tablet 650 mg, 650 mg, Oral, Q6H PRN, 650 mg at 12/22/24 1519 **OR** acetaminophen (TYLENOL) suppository 650 mg, 650 mg, Rectal, Q6H PRN, Tommy Emery, DO    Objective:    /60   Pulse 69   Temp 97.5 °F (36.4 °C) (Temporal)   Resp 18   Ht 1.575 m (5' 2\")   Wt 70.7 kg (155 lb 13.8 oz)   SpO2 97%   BMI 28.51 kg/m²     Heart:  reg  Lungs:  ctab  Abd: + bs soft nontender  Extrem:  w/o edema    CBC with Differential:    Lab 
4 Eyes Skin Assessment     NAME:  Felisa Joe  YOB: 1932  MEDICAL RECORD NUMBER:  [id number]    The patient is being assessed for  Transfer to New Unit    I agree that at least one RN has performed a thorough Head to Toe Skin Assessment on the patient. ALL assessment sites listed below have been assessed.      Areas assessed by both nurses:    Head, Face, Ears, Shoulders, Back, Chest, Arms, Elbows, Hands, Sacrum. Buttock, Coccyx, Ischium, and Legs. Feet and Heels        Does the Patient have a Wound? No noted wound(s)    -rash to left outer knee  - dry/flaky bilat feet  - bruising to BLE       Stan Prevention initiated by RN: No  Wound Care Orders initiated by RN: No    Pressure Injury (Stage 3,4, Unstageable, DTI, NWPT, and Complex wounds) if present, place Wound referral order by RN under : No    New Ostomies, if present place, Ostomy referral order under : No     Nurse 1 eSignature: Electronically signed by Paige Sharma RN on 12/23/24 at 6:55 PM EST    **SHARE this note so that the co-signing nurse can place an eSignature**    Nurse 2 eSignature: Electronically signed by Kimberlee Echevarria RN on 12/23/24 at 7:02 PM EST  
4 Eyes Skin Assessment     NAME:  Felisa Joe  YOB: 1932  MEDICAL RECORD NUMBER:  [id number]    The patient is being assessed for  Admission    I agree that at least one RN has performed a thorough Head to Toe Skin Assessment on the patient. ALL assessment sites listed below have been assessed.      Areas assessed by both nurses:    Head, Face, Ears, Shoulders, Back, Chest, Arms, Elbows, Hands, Sacrum. Buttock, Coccyx, Ischium, and Legs. Feet and Heels        Does the Patient have a Wound? No noted wound(s)       Stan Prevention initiated by RN: Yes  Wound Care Orders initiated by RN: No    Pressure Injury (Stage 3,4, Unstageable, DTI, NWPT, and Complex wounds) if present, place Wound referral order by RN under : No    New Ostomies, if present place, Ostomy referral order under : No     Nurse 1 eSignature: Electronically signed by Surendra Del Cid RN on 12/22/24 at 1:56 AM EST    **SHARE this note so that the co-signing nurse can place an eSignature**    Nurse 2 eSignature: Electronically signed by Joslyn Meier RN on 12/22/24 at 1:58 AM EST  
Ciera aware of consult.   
GENERAL SURGERY  DAILY PROGRESS NOTE  12/23/2024    Chief Complaint   Patient presents with    Altered Mental Status     Patient found on floor by family , patient denies falling , per EMS patient in new onset afib . Patient denies any CP/SOB        Subjective:  No acute issues overnight. Pt denies any signs of melena or hematochezia. Tolerating diet. No nausea or vomiting.    Objective:  BP (!) 129/54   Pulse 73   Temp 96.8 °F (36 °C) (Temporal)   Resp 18   Ht 1.575 m (5' 2\")   Wt 70.7 kg (155 lb 13.8 oz)   SpO2 95%   BMI 28.51 kg/m²     GENERAL:  Laying in bed, awake, alert, cooperative, no apparent distress  HEAD: Normocephalic, atraumatic  EYES: No sclera icterus, pupils equal  LUNGS:  No increased work of breathing on room air  CARDIOVASCULAR:  RR and normotensive  ABDOMEN:  Soft, non-tender, non-distended  EXTREMITIES: No edema or swelling    Assessment/Plan:  89 y.o. female with anemia, but no signs of gastrointestinal hemorrhage    - Continue diet as tolerated  - Monitor Hgb, transfuse as needed  - 1u PRBC today, check post-trans Hb  - If Hgb responds appropriately, then no plans for scopes  - If Hgb does not respond, then will discuss possible EGD  - PPI BID    Electronically signed by Alessandro Means MD on 12/23/2024 at 6:56 AM    
GENERAL SURGERY  DAILY PROGRESS NOTE  12/24/2024    Chief Complaint   Patient presents with    Altered Mental Status     Patient found on floor by family , patient denies falling , per EMS patient in new onset afib . Patient denies any CP/SOB        Subjective:  No issues overnight. Tolerating diet. No further nausea or vomiting.    HgB 8.1 from 6.5.    Objective:  /60   Pulse 69   Temp 97.5 °F (36.4 °C) (Temporal)   Resp 18   Ht 1.575 m (5' 2\")   Wt 70.7 kg (155 lb 13.8 oz)   SpO2 97%   BMI 28.51 kg/m²     GENERAL:  Laying in bed, awake, alert, cooperative, no apparent distress  HEAD: Normocephalic, atraumatic  EYES: No sclera icterus, pupils equal  LUNGS:  No increased work of breathing on room air  CARDIOVASCULAR:  RR and normotensive  ABDOMEN:  Soft, non-tender, non-distended  EXTREMITIES: No edema or swelling    Assessment/Plan:  89 y.o. female with anemia, but no signs of gastrointestinal hemorrhage    - Continue diet as tolerated  - Monitor Hgb, transfuse as needed  - No plans for endoscopy  - PPI BID  - Please message [facility] with any questions or concerns. Will be available as needed.    Electronically signed by Christi Novak MD on 12/24/2024 at 6:37 AM    The patient was seen and examined and the chart was reviewed.  I agree with the assessment and plan.  The patient had an appropriate response to the single unit of packed cells.  Continue to monitor the patient's hemoglobin and hematocrit.  Endoscopy if the patient has a precipitous drop in her blood counts.  Bradley Leonard MD    
General Surgery, Dr. Leonard consulted via phone.   Resident, Dr. DORIS Serrano, General Surgery, consulted via perfect serve.   
Lighting rounds was done on pt . Educated pt on fall risk. Provided patient with yellow gripper socks and fall risk wristband.    
Nurse to nurse given to Alena on 84. All questions answered. Transport is in.   
Patient had episode of emesis. Per patient relief occurred immediately after emesis. General surgery notified.   
Patient has been anointed by father Roberto and received communion x3.       Bernie Mishra  
Physical Therapy  Physical Therapy Initial Assessment     Name: Felisa Joe  : 1932  MRN: [id number]      Date of Service: 2024    Evaluating PT:  Chapito Brito PT, DPT    Room #:  8211/8211-B  Diagnosis:  Dehydration [E86.0]  Fall, initial encounter [W19.XXXA]  Fall at home, initial encounter [W19.XXXA, Y92.009]  PMHx/PSHx:  none on file  Procedure/Surgery:  N/A  Precautions:  fall risk, [place]  Equipment Needs:  wheeled walker    SUBJECTIVE:    Pt lives alone in a 1 story home with 1 steps to enter and no handrail.  Bed/bath is on 1st floor.  Pt ambulated with no AD PTA.    OBJECTIVE:   Initial Evaluation  Date: 24 Treatment Short Term/ Long Term   Goals   AM-PAC 6 Clicks      Was pt agreeable to Eval/treatment? yes     Does pt have pain? No pain     Bed Mobility  Rolling: SBA  Supine to sit: CGA  Sit to supine: NT  Scooting: SBA  Rolling: IND  Supine to sit: IND  Sit to supine: IND  Scooting: IND   Transfers Sit to stand: SBA  Stand to sit: SBA  Stand pivot: SBA with ww  Sit to stand: IND  Stand to sit: IND  Stand pivot: mod I with AAD   Ambulation    75'x2 with ww SBA  250'+ with AAD mod I   Stair negotiation: ascended and descended  NT  1 steps with AAD and no handrail mod I     Strength/ROM:   BLE grossly 4/5  BLE AROM WFL    Balance:   Static Sitting: Supervision  Dynamic Sitting: Supervision  Static Standing: SBA with ww  Dynamic Standing: SBA with ww    Pt is A & O x 3  Sensation:  Pt denies numbness and tingling to extremities  Edema:  unremarkable    Vitals:  SpO2 and HR were stable during session    Therapeutic Exercises:    Bed mobility: supine>sit, cued for EOB positioning  Transfers: STS x1, cued for hand placement and postural correction  Ambulation: 75'x2 with ww  BLE AROM    Patient education  Pt educated on role of PT, importance of functional mobility during hospital stay, safety with functional mobility    Patient response to education:   Pt verbalized understanding Pt 
increased safety with functional transfers/mobility and ADLs  * Therapeutic exercise to improve motor endurance, ROM, and functional strength for ADLs/functional transfers  * Therapeutic activities to facilitate/challenge dynamic balance, stand tolerance for increased safety and independence with ADLs  * Positioning to improve skin integrity, interaction with environment and functional independence     Home Living: Pt lives alone in a 1 story home with 1 step(s) to enter; bed/bath on main level.  Bathroom setup: Tub/shower  Equipment owned: None     Prior Level of Function: IND with ADLs;  IND with IADLs. No use of AD for functional mobility.   Driving: No  Occupation: None reported     Pain Level: Pt c/o 0/10 pain this session; therapist facilitated repositioning techniques.    Cognition: A&O: 4/4; Follows mutli step directions, pleasant & cooperative, eager to go home               Memory:  good               Sequencing:  good               Problem solving:  good               Judgement/safety: fair +                Functional Assessment:  AM-PAC Daily Activity Raw Score: 19/24    Initial Eval Status  Date: 12/22/24 Treatment Status  Date:  12/26/24 STGs = LTGs  Time frame: 10-14 days   Feeding Independent        Grooming Supervision   Supervision  Standing  Modified [place]    UB Dressing Supervision   Supervision  Simulated  Modified [place]    LB Dressing Stand by Assist   SBA  Doff/evelin socks using cross over technique, instructed modified techniques for donning pants/undergarments, sitting down to thread legs, then standing to pull over hips for safety  Modified [place]    Bathing Stand by Assist  SBA  Simulated, recommending shower chair if needed  Modified [place]    Toileting Stand by Assist   SBA  Simulated  Modified [place]    Bed Mobility  Log Roll: NT   Supine to sit: Stand by Assist   Sit to supine: NT   Indep  Supine to Sit  Supine to sit: Independent   Sit to supine: 
sensation  []Decreased coordination   []Decreased vision  []Decreased safety awareness   []Increased pain       Comments:  Pt supine in bed upon arrival and agreed to participate in therapy. Pt completed functional mobility as noted above.  Pt ambulated without device with unsteadiness.  Ambulation with walker with increased postural stability.  Decreased walker approximation to improve safety and upright posture.  Verbal instruction for energy conservation. Time spent on educating pt on proper walker safety. Increased time with all activity.  Pt returned to chair with call light in reach.      Treatment:  Patient practiced and was instructed in the following treatment:    Bed mobility training - pt able to complete with independence.   Transfer training - Verbal instruction for hand placement and technique to improve safety and balance.    Gait training- Verbal instruction for walker approximation to improve postural stability and safety.       Pt's/ family goals   1. Return home    Prognosis is good for reaching above PT goals.    Patient and or family understand(s) diagnosis, prognosis, and plan of care.  yes    PHYSICAL THERAPY PLAN OF CARE:    PT POC is established based on physician order and patient diagnosis     Referring provider/PT Order:  Tommy Emery,    Diagnosis:  Dehydration [E86.0]  Fall, initial encounter [W19.XXXA]  Fall at home, initial encounter [W19.XXXA, Y92.009]  Specific instructions for next treatment:  Progress as tolerated    Current Treatment Recommendations:     [x] Strengthening to improve independence with functional mobility   [] ROM to improve independence with functional mobility   [x] Balance Training to improve static/dynamic balance and to reduce fall risk  [x] Endurance Training to improve activity tolerance during functional mobility   [x] Transfer Training to improve safety and independence with all functional transfers   [x] Gait Training to improve gait mechanics, 
Static:  Stand by Assist w/ ww    Dynamic:Stand by Assist w/ ww  Standing:    Static:  Modified [place]     Dynamic:Modified [place]    Activity Tolerance fair + w/ light activity  good  w/ light to mod activity   Visual/  Perceptual Glasses: Yes - readers.  WFL.     Safety fair +  good   during ADL completion      AROM (PROM) Strength Additional Info:  Goal: (PRN)   RUE  WFL 4/5 grossly tested good   and good  FMC/dexterity noted during ADL tasks Improve overall RUE strength WFL for participation in functional tasks       LUE WFL 4/5 grossly tested good   and good  FMC/dexterity noted during ADL tasks Improve overall LUE strength WFL for participation in functional tasks       Hearing: very [place]  Sensation: No c/o numbness or tingling  Tone: WFL  Edema: Unremarkable    Comment: Cleared by RN to see pt. Upon arrival patient supine in bed and agreeable to OT session. At end of session, patient seated in bedside chair with call light and phone within reach, all lines and tubes intact.  Overall patient demonstrated decreased independence and safety during completion of ADL/functional transfer/mobility tasks. Therapist facilitated ADL tasks, functional transfers, functional mobility, bed mobility to address safety awareness, implementation of fall prevention strategies, & functional engagement throughout daily activities. Pt would benefit from continued skilled OT to increase safety and independence with completion of ADL/IADL tasks for functional independence and quality of life.    Rehab Potential: good  for established goals     LTG: maximize independence with ADLs to return to PLOF    Patient and/or family were instructed on functional diagnosis, prognosis/goals and OT plan of care. Demonstrated good understanding.     Eval Complexity: Low  History: Expanded chart review of medical records and additional review of physical, cognitive, or psychosocial history related to current functional

## 2024-12-31 ENCOUNTER — TELEPHONE (OUTPATIENT)
Dept: NON INVASIVE DIAGNOSTICS | Age: 88
End: 2024-12-31

## 2024-12-31 DIAGNOSIS — R00.1 BRADYCARDIA: Primary | ICD-10-CM

## 2024-12-31 NOTE — TELEPHONE ENCOUNTER
We are in receipt of a referral from YOSELIN Lemos at [facility] for this patient.   She was seen in consultation by Dr. Vang on 12/25/2024.  PCP is requesting follow-up for this patient in our office.  Please review and advise regarding scheduling recommendations.   Thank you.

## 2025-01-02 NOTE — TELEPHONE ENCOUNTER
Daughter notified and monitor mailed to address on file.     Electronically signed by Saloni Fletcher MA on 1/2/2025 at 9:53 AM

## 2025-01-09 ENCOUNTER — NURSE ONLY (OUTPATIENT)
Dept: NON INVASIVE DIAGNOSTICS | Age: 89
End: 2025-01-09

## 2025-01-09 NOTE — PROGRESS NOTES
Francisco event monitor(14 day) was mailed to pts home but she came into office to have it put on. I put on monitor and went over directions with pt and her daughter. Pt and daughter stated understanding and gave verbalized read back.

## 2025-01-30 DIAGNOSIS — R00.1 BRADYCARDIA: ICD-10-CM

## 2025-02-05 ENCOUNTER — TELEPHONE (OUTPATIENT)
Dept: NON INVASIVE DIAGNOSTICS | Age: 89
End: 2025-02-05

## 2025-02-05 NOTE — TELEPHONE ENCOUNTER
----- Message from Dr. Connor Vang DO sent at 2/3/2025  9:03 AM EST -----  Regarding: monitor  monitor: no significant bradycardia    Follow-up as scheduled.    -Connor Vang DO

## 2025-03-05 NOTE — PROGRESS NOTES
on file.  There is no family history of sudden cardiac arrest    Social History     Tobacco Use    Smoking status: Never    Smokeless tobacco: Never   Substance Use Topics    Alcohol use: Yes     Alcohol/week: 1.0 standard drink of alcohol     Types: 1 Glasses of wine per week     Comment: Daily       Current Outpatient Medications   Medication Sig Dispense Refill    ferrous sulfate (IRON 325) 325 (65 Fe) MG tablet Take 1 tablet by mouth 2 times daily (with meals) 60 tablet 0    pantoprazole (PROTONIX) 40 MG tablet Take 1 tablet by mouth 2 times daily (before meals) 60 tablet 0    triamterene-hydrochlorothiazide (MAXZIDE) 75-50 MG per tablet Take 0.5 tablets by mouth daily  3     No current facility-administered medications for this visit.        No Known Allergies    ROS:   Constitutional: Negative for fever, activity change and appetite change.   HENT: Negative for epistaxis.   Eyes: Negative for diploplia, blurred vision.   Respiratory: Negative for cough, chest tightness, shortness of breath and wheezing.   Cardiovascular: pertinent positives in HPI  Gastrointestinal: Negative for abdominal pain and blood in stool.   Genitourinary: Negative for hematuria and difficulty urinating.   Musculoskeletal: Negative for myalgias and gait problem.   Skin: Negative for color change and rash.   Neurological: Negative for syncope and light-headedness.   Psychiatric/Behavioral: Negative for confusion and agitation. The patient is not nervous/anxious.  Heme: no bleeding disorders, no melena or hematochezia  All other review of systems are negative     PHYSICAL EXAM:  Constitutional   Vitals:    03/06/25 1137   BP: 130/62   Pulse: 63   Resp: 16   Temp: 97.8 °F (36.6 °C)   SpO2: 94%   Weight: 74.1 kg (163 lb 6.4 oz)   Height: 1.473 m (4' 10\")    Well-developed, no acute distress, well groomed, obese  Eyes: conjunctivae normal, no xanthelasma   Ears, Nose, Throat: oral mucosa moist, no cyanosis   Neck: supple, no JVD, no bruits,

## 2025-03-06 ENCOUNTER — OFFICE VISIT (OUTPATIENT)
Dept: NON INVASIVE DIAGNOSTICS | Age: 89
End: 2025-03-06
Payer: MEDICARE

## 2025-03-06 VITALS
WEIGHT: 163.4 LBS | HEIGHT: 58 IN | BODY MASS INDEX: 34.3 KG/M2 | RESPIRATION RATE: 16 BRPM | DIASTOLIC BLOOD PRESSURE: 62 MMHG | HEART RATE: 63 BPM | TEMPERATURE: 97.8 F | SYSTOLIC BLOOD PRESSURE: 130 MMHG | OXYGEN SATURATION: 94 %

## 2025-03-06 DIAGNOSIS — R00.1 BRADYCARDIA: Primary | ICD-10-CM

## 2025-03-06 PROCEDURE — 99213 OFFICE O/P EST LOW 20 MIN: CPT | Performed by: STUDENT IN AN ORGANIZED HEALTH CARE EDUCATION/TRAINING PROGRAM

## 2025-03-06 PROCEDURE — 1159F MED LIST DOCD IN RCRD: CPT | Performed by: STUDENT IN AN ORGANIZED HEALTH CARE EDUCATION/TRAINING PROGRAM

## 2025-03-06 PROCEDURE — 4004F PT TOBACCO SCREEN RCVD TLK: CPT | Performed by: STUDENT IN AN ORGANIZED HEALTH CARE EDUCATION/TRAINING PROGRAM

## 2025-03-06 PROCEDURE — G8427 DOCREV CUR MEDS BY ELIG CLIN: HCPCS | Performed by: STUDENT IN AN ORGANIZED HEALTH CARE EDUCATION/TRAINING PROGRAM

## 2025-03-06 PROCEDURE — 93000 ELECTROCARDIOGRAM COMPLETE: CPT | Performed by: STUDENT IN AN ORGANIZED HEALTH CARE EDUCATION/TRAINING PROGRAM

## 2025-03-06 PROCEDURE — 1123F ACP DISCUSS/DSCN MKR DOCD: CPT | Performed by: STUDENT IN AN ORGANIZED HEALTH CARE EDUCATION/TRAINING PROGRAM

## 2025-03-06 PROCEDURE — 1090F PRES/ABSN URINE INCON ASSESS: CPT | Performed by: STUDENT IN AN ORGANIZED HEALTH CARE EDUCATION/TRAINING PROGRAM

## 2025-03-06 PROCEDURE — G8417 CALC BMI ABV UP PARAM F/U: HCPCS | Performed by: STUDENT IN AN ORGANIZED HEALTH CARE EDUCATION/TRAINING PROGRAM

## 2025-03-06 NOTE — PATIENT INSTRUCTIONS
No medication changes today.  Dr Vang office will contact your PCP's office for a copy of recent lab tests.

## 2025-03-19 ENCOUNTER — TELEPHONE (OUTPATIENT)
Dept: NON INVASIVE DIAGNOSTICS | Age: 89
End: 2025-03-19

## 2025-03-19 NOTE — TELEPHONE ENCOUNTER
I was able to speak with a office staff and they will fax most recent cCBC (last bmp/cmp was 4/2024).

## 2025-03-19 NOTE — TELEPHONE ENCOUNTER
----- Message from Dr. Connor Vang DO sent at 3/6/2025 11:56 AM EST -----  Regarding: PCP labs  Recent GI bleed admit.    Please contact PCP's office for a copy of the recent labs tests.    -Connor Vang DO